# Patient Record
Sex: MALE | Race: ASIAN | NOT HISPANIC OR LATINO | Employment: OTHER | URBAN - METROPOLITAN AREA
[De-identification: names, ages, dates, MRNs, and addresses within clinical notes are randomized per-mention and may not be internally consistent; named-entity substitution may affect disease eponyms.]

---

## 2017-05-10 ENCOUNTER — OFFICE VISIT (OUTPATIENT)
Dept: MEDICAL GROUP | Facility: PHYSICIAN GROUP | Age: 57
End: 2017-05-10
Payer: COMMERCIAL

## 2017-05-10 VITALS
RESPIRATION RATE: 16 BRPM | HEART RATE: 70 BPM | SYSTOLIC BLOOD PRESSURE: 132 MMHG | DIASTOLIC BLOOD PRESSURE: 80 MMHG | TEMPERATURE: 98.6 F | BODY MASS INDEX: 24.34 KG/M2 | OXYGEN SATURATION: 96 % | WEIGHT: 170 LBS | HEIGHT: 70 IN

## 2017-05-10 DIAGNOSIS — E78.5 DYSLIPIDEMIA: ICD-10-CM

## 2017-05-10 DIAGNOSIS — R22.41 MASS OF LOWER EXTREMITY, RIGHT: ICD-10-CM

## 2017-05-10 DIAGNOSIS — R73.01 IMPAIRED FASTING BLOOD SUGAR: ICD-10-CM

## 2017-05-10 DIAGNOSIS — Z12.5 SCREENING FOR PROSTATE CANCER: ICD-10-CM

## 2017-05-10 PROCEDURE — 99213 OFFICE O/P EST LOW 20 MIN: CPT | Performed by: FAMILY MEDICINE

## 2017-05-10 NOTE — PROGRESS NOTES
"Subjective:      Leander Saxena is a 57 y.o. male who presents with Other            Other      Patient is here accompanied by his wife because of a lump back of the right knee that he noticed about 2 weeks ago. He has pain and discomfort when he kneels but otherwise there is no pain when he is walking or doing other activities. He denies any trauma.    Past medical history, past surgical history, family history reviewed-no changes    Social history reviewed-no changes    Allergies reviewed-no changes    Medications reviewed-no changes    ROS     As per history of present illness, the rest are negative.       Objective:     /80 mmHg  Pulse 70  Temp(Src) 37 °C (98.6 °F)  Resp 16  Ht 1.778 m (5' 10\")  Wt 77.111 kg (170 lb)  BMI 24.39 kg/m2  SpO2 96%     Physical Exam     Examined alert, awake, oriented, not in distress    Neck-supple, no lymphadenopathy, no thyromegaly  Lungs-clear to auscultation, no rales, no wheezes  Heart-regular rate and rhythm, no murmur  Extremities-no edema, clubbing, cyanosis, positive soft subcutaneous mass right popliteal fossa which is nontender, probably Baker's cyst            Assessment/Plan:     1. Mass of lower extremity, right  We'll get ultrasound to further evaluate this. Discussed natural course of the problem if this is Baker's cyst. We don't do any intervention unless this this increases in size and causes more discomfort and pain.  - US -EXTREMITY NON VASCULAR UNILATERAL RIGHT; Future  - LIPID PROFILE; Future  - COMP METABOLIC PANEL; Future  - HEMOGLOBIN A1C; Future  - CBC WITH DIFFERENTIAL; Future  - PROSTATE SPECIFIC AG SCREENING; Future    2. Impaired fasting blood sugar  He will do blood work to follow-up on his blood sugar, hemoglobin A1c to be done before his next appointment in September  - LIPID PROFILE; Future  - COMP METABOLIC PANEL; Future  - HEMOGLOBIN A1C; Future  - CBC WITH DIFFERENTIAL; Future  - PROSTATE SPECIFIC AG SCREENING; Future    3. " Dyslipidemia  We will do follow-up lipid panel to be done prior to his next appointment.  - LIPID PROFILE; Future  - COMP METABOLIC PANEL; Future  - HEMOGLOBIN A1C; Future  - CBC WITH DIFFERENTIAL; Future  - PROSTATE SPECIFIC AG SCREENING; Future    4. Screening for prostate cancer  We will include screening PSA with the next blood work.  - LIPID PROFILE; Future  - COMP METABOLIC PANEL; Future  - HEMOGLOBIN A1C; Future  - CBC WITH DIFFERENTIAL; Future  - PROSTATE SPECIFIC AG SCREENING; Future      Please note that this dictation was created using voice recognition software. I have worked with consultants from the vendor as well as technical experts from [employer] to optimize the interface. I have made every reasonable attempt to correct obvious errors, but I expect that there are errors of grammar and possibly content I did not discover before finalizing the note.

## 2017-05-10 NOTE — MR AVS SNAPSHOT
"        Leander Gonsalezkelly   5/10/2017 10:40 AM   Office Visit   MRN: [id number]    Department:  [facility]   Dept Phone:  [phone]    Description:  Male : 1960   Provider:  Marcela Tomas M.D.           Reason for Visit     Other mass on the back of R knee       Allergies as of 5/10/2017     No Known Allergies      You were diagnosed with     Mass of lower extremity, right   [3449731]       Impaired fasting blood sugar   [955688]       Dyslipidemia   [440946]       Screening for prostate cancer   [[id number]]         Vital Signs     Blood Pressure Pulse Temperature Respirations Height Weight    132/80 mmHg 70 37 °C (98.6 °F) 16 1.778 m (5' 10\") 77.111 kg (170 lb)    Body Mass Index Oxygen Saturation Smoking Status             24.39 kg/m2 96% Current Every Day Smoker         Basic Information     Date Of Birth Sex Race Ethnicity Preferred Language    1960 Male  Unknown English      Your appointments     Sep 14, 2017 10:00 AM   Established Patient with Marcela Tomas M.D.   [facility] (--)    [address]  [address]  [facility] [id number] [phone]           You will be receiving a confirmation call a few days before your appointment from our automated call confirmation system.              Problem List              ICD-10-CM Priority Class Noted - Resolved    Dyslipidemia E78.5   2014 - Present    Impaired fasting blood sugar R73.01   2016 - Present      Health Maintenance        Date Due Completion Dates    IMM DTaP/Tdap/Td Vaccine (2 - Td) 2023    COLONOSCOPY 2023            Current Immunizations     Influenza TIV (IM) 11/3/2011    Influenza Vaccine Quad Inj (Preserved) 2016, 2014    Tdap Vaccine 2013      Below and/or attached are the medications your provider expects you to take. Review all of your home medications and newly ordered medications with your provider and/or pharmacist. Follow medication instructions as " directed by your provider and/or pharmacist. Please keep your medication list with you and share with your provider. Update the information when medications are discontinued, doses are changed, or new medications (including over-the-counter products) are added; and carry medication information at all times in the event of emergency situations     Allergies:  No Known Allergies          Medications  Valid as of: May 10, 2017 - 10:54 AM    Generic Name Brand Name Tablet Size Instructions for use    .                 Medicines prescribed today were sent to:     None      Medication refill instructions:       If your prescription bottle indicates you have medication refills left, it is not necessary to call your provider’s office. Please contact your pharmacy and they will refill your medication.    If your prescription bottle indicates you do not have any refills left, you may request refills at any time through one of the following ways: The online [employer] system (except Urgent Care), by calling your provider’s office, or by asking your pharmacy to contact your provider’s office with a refill request. Medication refills are processed only during regular business hours and may not be available until the next business day. Your provider may request additional information or to have a follow-up visit with you prior to refilling your medication.   *Please Note: Medication refills are assigned a new Rx number when refilled electronically. Your pharmacy may indicate that no refills were authorized even though a new prescription for the same medication is available at the pharmacy. Please request the medicine by name with the pharmacy before contacting your provider for a refill.        Your To Do List     Future Labs/Procedures Complete By Expires    CBC WITH DIFFERENTIAL  As directed 5/11/2018    COMP METABOLIC PANEL  As directed 5/11/2018    HEMOGLOBIN A1C  As directed 5/11/2018    LIPID PROFILE  As directed 5/11/2018    PROSTATE SPECIFIC AG SCREENING  As directed 5/11/2018    US-EXTREMITY NON VASCULAR UNILATERAL RIGHT  As directed 5/10/2018         S*[employer] Access Code: Activation code not generated  Current S*[employer] Status: Active          Quit Tobacco Information     Do you want to quit using tobacco?    Quitting tobacco decreases risks of cancer, heart and lung disease, increases life expectancy, improves sense of taste and smell, and increases spending money, among other benefits.    If you are thinking about quitting, we can help.  • [facility] Quit Tobacco Program: 817.120.6211  o Program occurs weekly for four weeks and includes pharmacist consultation on products to support quitting smoking or chewing tobacco. A provider referral is needed for pharmacist consultation.  • Tobacco Users Help Hotline: 0-079-QUITNOW (880-9983) or https://[place].quitlogix.org/  o Free, confidential telephone and online coaching for [place] residents. Sessions are designed on a schedule that is convenient for you. Eligible clients receive free nicotine replacement therapy.  • Nationally: www.smokefree.gov  o Information and professional assistance to support both immediate and long-term needs as you become, and remain, a non-smoker. Smokefree.gov allows you to choose the help that best fits your needs.

## 2017-05-17 ENCOUNTER — HOSPITAL ENCOUNTER (OUTPATIENT)
Dept: RADIOLOGY | Facility: MEDICAL CENTER | Age: 57
End: 2017-05-17
Attending: FAMILY MEDICINE
Payer: COMMERCIAL

## 2017-05-17 DIAGNOSIS — R22.41 MASS OF LOWER EXTREMITY, RIGHT: ICD-10-CM

## 2017-05-17 PROCEDURE — 76882 US LMTD JT/FCL EVL NVASC XTR: CPT | Mod: RT

## 2017-09-11 ENCOUNTER — HOSPITAL ENCOUNTER (OUTPATIENT)
Dept: LAB | Facility: MEDICAL CENTER | Age: 57
End: 2017-09-11
Attending: FAMILY MEDICINE
Payer: COMMERCIAL

## 2017-09-11 DIAGNOSIS — Z12.5 SCREENING FOR PROSTATE CANCER: ICD-10-CM

## 2017-09-11 DIAGNOSIS — E78.5 DYSLIPIDEMIA: ICD-10-CM

## 2017-09-11 DIAGNOSIS — R22.41 MASS OF LOWER EXTREMITY, RIGHT: ICD-10-CM

## 2017-09-11 DIAGNOSIS — R73.01 IMPAIRED FASTING BLOOD SUGAR: ICD-10-CM

## 2017-09-11 LAB
ALBUMIN SERPL BCP-MCNC: 4.1 G/DL (ref 3.2–4.9)
ALBUMIN/GLOB SERPL: 1.5 G/DL
ALP SERPL-CCNC: 48 U/L (ref 30–99)
ALT SERPL-CCNC: 16 U/L (ref 2–50)
ANION GAP SERPL CALC-SCNC: 6 MMOL/L (ref 0–11.9)
AST SERPL-CCNC: 17 U/L (ref 12–45)
BASOPHILS # BLD AUTO: 1.4 % (ref 0–1.8)
BASOPHILS # BLD: 0.09 K/UL (ref 0–0.12)
BILIRUB SERPL-MCNC: 0.7 MG/DL (ref 0.1–1.5)
BUN SERPL-MCNC: 14 MG/DL (ref 8–22)
CALCIUM SERPL-MCNC: 9.3 MG/DL (ref 8.5–10.5)
CHLORIDE SERPL-SCNC: 105 MMOL/L (ref 96–112)
CHOLEST SERPL-MCNC: 161 MG/DL (ref 100–199)
CO2 SERPL-SCNC: 27 MMOL/L (ref 20–33)
CREAT SERPL-MCNC: 0.73 MG/DL (ref 0.5–1.4)
EOSINOPHIL # BLD AUTO: 0.33 K/UL (ref 0–0.51)
EOSINOPHIL NFR BLD: 5.2 % (ref 0–6.9)
ERYTHROCYTE [DISTWIDTH] IN BLOOD BY AUTOMATED COUNT: 40.1 FL (ref 35.9–50)
EST. AVERAGE GLUCOSE BLD GHB EST-MCNC: 131 MG/DL
GFR SERPL CREATININE-BSD FRML MDRD: >60 ML/MIN/1.73 M 2
GLOBULIN SER CALC-MCNC: 2.8 G/DL (ref 1.9–3.5)
GLUCOSE SERPL-MCNC: 99 MG/DL (ref 65–99)
HBA1C MFR BLD: 6.2 % (ref 0–5.6)
HCT VFR BLD AUTO: 46.3 % (ref 42–52)
HDLC SERPL-MCNC: 45 MG/DL
HGB BLD-MCNC: 15.2 G/DL (ref 14–18)
IMM GRANULOCYTES # BLD AUTO: 0.01 K/UL (ref 0–0.11)
IMM GRANULOCYTES NFR BLD AUTO: 0.2 % (ref 0–0.9)
LDLC SERPL CALC-MCNC: 95 MG/DL
LYMPHOCYTES # BLD AUTO: 2.12 K/UL (ref 1–4.8)
LYMPHOCYTES NFR BLD: 33.4 % (ref 22–41)
MCH RBC QN AUTO: 27.3 PG (ref 27–33)
MCHC RBC AUTO-ENTMCNC: 32.8 G/DL (ref 33.7–35.3)
MCV RBC AUTO: 83.1 FL (ref 81.4–97.8)
MONOCYTES # BLD AUTO: 0.6 K/UL (ref 0–0.85)
MONOCYTES NFR BLD AUTO: 9.5 % (ref 0–13.4)
NEUTROPHILS # BLD AUTO: 3.19 K/UL (ref 1.82–7.42)
NEUTROPHILS NFR BLD: 50.3 % (ref 44–72)
NRBC # BLD AUTO: 0 K/UL
NRBC BLD AUTO-RTO: 0 /100 WBC
PLATELET # BLD AUTO: 241 K/UL (ref 164–446)
PMV BLD AUTO: 9 FL (ref 9–12.9)
POTASSIUM SERPL-SCNC: 4 MMOL/L (ref 3.6–5.5)
PROT SERPL-MCNC: 6.9 G/DL (ref 6–8.2)
PSA SERPL-MCNC: 1.18 NG/ML (ref 0–4)
RBC # BLD AUTO: 5.57 M/UL (ref 4.7–6.1)
SODIUM SERPL-SCNC: 138 MMOL/L (ref 135–145)
TRIGL SERPL-MCNC: 104 MG/DL (ref 0–149)
WBC # BLD AUTO: 6.3 K/UL (ref 4.8–10.8)

## 2017-09-11 PROCEDURE — 36415 COLL VENOUS BLD VENIPUNCTURE: CPT

## 2017-09-11 PROCEDURE — 85025 COMPLETE CBC W/AUTO DIFF WBC: CPT

## 2017-09-11 PROCEDURE — 80053 COMPREHEN METABOLIC PANEL: CPT

## 2017-09-11 PROCEDURE — 84153 ASSAY OF PSA TOTAL: CPT

## 2017-09-11 PROCEDURE — 83036 HEMOGLOBIN GLYCOSYLATED A1C: CPT

## 2017-09-11 PROCEDURE — 80061 LIPID PANEL: CPT

## 2017-09-13 ENCOUNTER — TELEPHONE (OUTPATIENT)
Dept: MEDICAL GROUP | Facility: PHYSICIAN GROUP | Age: 57
End: 2017-09-13

## 2017-09-13 NOTE — TELEPHONE ENCOUNTER
ESTABLISHED PATIENT PRE-VISIT PLANNING     Note: Patient will not be contacted if there is no indication to call.     1.  Reviewed notes from the last few office visits within the medical group: Yes    2.  If any orders were placed at last visit or intended to be done for this visit (i.e. 6 mos follow-up), do we have Results/Consult Notes?        •  Labs - Labs ordered, completed and results are in chart.       •  Imaging - Imaging ordered, completed and results are in chart.       •  Referrals - No referrals were ordered at last office visit.    3. Is this appointment scheduled as a Hospital Follow-Up? No    4.  Immunizations were updated in Epic using WebIZ?: Epic matches WebIZ       •  Web Iz Recommendations: FLU, MMR , TD and VARICELLA (Chicken Pox)     5.  Patient is due for the following Health Maintenance Topics:   Health Maintenance Due   Topic Date Due   • IMM PNEUMOCOCCAL 19-64 (ADULT) MEDIUM RISK SERIES (1 of 1 - PPSV23) 02/22/1978   • IMM INFLUENZA (1) 09/01/2017       - Patient has completed FLU and TDAP Immunization(s) per WebIZ. Chart has been updated.      6.  Patient was NOT informed to arrive 15 min prior to their scheduled appointment and bring in their medication bottles.

## 2017-09-14 ENCOUNTER — OFFICE VISIT (OUTPATIENT)
Dept: MEDICAL GROUP | Facility: PHYSICIAN GROUP | Age: 57
End: 2017-09-14
Payer: COMMERCIAL

## 2017-09-14 VITALS
DIASTOLIC BLOOD PRESSURE: 60 MMHG | HEIGHT: 70 IN | TEMPERATURE: 97.9 F | BODY MASS INDEX: 22.91 KG/M2 | SYSTOLIC BLOOD PRESSURE: 116 MMHG | OXYGEN SATURATION: 95 % | WEIGHT: 160.05 LBS | HEART RATE: 62 BPM

## 2017-09-14 DIAGNOSIS — R73.01 IMPAIRED FASTING BLOOD SUGAR: ICD-10-CM

## 2017-09-14 DIAGNOSIS — F17.200 TOBACCO DEPENDENCE: ICD-10-CM

## 2017-09-14 DIAGNOSIS — Z00.00 ROUTINE PHYSICAL EXAMINATION: ICD-10-CM

## 2017-09-14 DIAGNOSIS — E78.5 DYSLIPIDEMIA: ICD-10-CM

## 2017-09-14 DIAGNOSIS — Z23 NEED FOR IMMUNIZATION AGAINST INFLUENZA: ICD-10-CM

## 2017-09-14 PROCEDURE — 99396 PREV VISIT EST AGE 40-64: CPT | Mod: 25 | Performed by: FAMILY MEDICINE

## 2017-09-14 PROCEDURE — 90471 IMMUNIZATION ADMIN: CPT | Performed by: FAMILY MEDICINE

## 2017-09-14 PROCEDURE — 99406 BEHAV CHNG SMOKING 3-10 MIN: CPT | Performed by: FAMILY MEDICINE

## 2017-09-14 PROCEDURE — 99214 OFFICE O/P EST MOD 30 MIN: CPT | Mod: 25 | Performed by: FAMILY MEDICINE

## 2017-09-14 PROCEDURE — 90686 IIV4 VACC NO PRSV 0.5 ML IM: CPT | Performed by: FAMILY MEDICINE

## 2017-09-14 ASSESSMENT — PATIENT HEALTH QUESTIONNAIRE - PHQ9: CLINICAL INTERPRETATION OF PHQ2 SCORE: 0

## 2017-09-14 NOTE — PROGRESS NOTES
Subjective:      Leander Saxena is a 57 y.o. male who presents with Follow-Up (labs) and Immunizations (flu)            HPI     Patient returns routine physical exam as well as for follow-up of his medical problems.    His last colonoscopy was in 6/13 that showed adenomatous polyp therefore he needs repeat colonoscopy in 2018. His last tdap was in 5/13.    We follow him for impaired fasting blood sugar. He manages this by watching his diet.    He has dyslipidemia which he is managing with his own efforts. He said he has been more watchful with his diet and he has been keeping himself active. He did a follow-up blood work before this visit.    He continues to smoke 3 cigarettes a day for the last 20 years.    I reviewed the following    Past Medical History:   Diagnosis Date   • Dyslipidemia    • Impaired fasting blood sugar         Past Surgical History:   Procedure Laterality Date   • COLONOSCOPY WITH POLYP  6/19/13    asc colon polyp-adenomatous, mild diverticulosis asc colon       No Known Allergies    No current outpatient prescriptions on file.     No current facility-administered medications for this visit.         Family History   Problem Relation Age of Onset   • Diabetes Mother    • Hypertension Mother    • Hyperlipidemia Mother    • Heart Attack Father    • Hypertension Father    • Hyperlipidemia Father    • Diabetes Sister    • Diabetes Brother    • Heart Attack Brother      age 60   • Diabetes Brother    • No Known Problems Sister        Social History     Social History   • Marital status:      Spouse name: N/A   • Number of children: 2   • Years of education: N/A     Occupational History   • retired Other     Social History Main Topics   • Smoking status: Current Every Day Smoker     Years: 20.00     Types: Cigarettes   • Smokeless tobacco: Never Used      Comment: 3 cig/day   • Alcohol use 0.0 oz/week      Comment: occasional   • Drug use:      Types: Marijuana      Comment: used cocaine in  "the past   • Sexual activity: Yes     Partners: Female     Other Topics Concern   • Not on file     Social History Narrative   • No narrative on file        ROS     Review of Systems  Constitutional: Negative for fever, chills, weight loss and malaise/fatigue.   HEENT: Negative for ear pain, nosebleeds, congestion, sore throat and neck pain.    Eyes: Negative for blurred vision.   Respiratory: Negative for cough, sputum production, shortness of breath and wheezing.    Cardiovascular: Negative for chest pain, palpitations, orthopnea and leg swelling.   Gastrointestinal: Negative for heartburn, nausea, vomiting and abdominal pain.   Genitourinary: Negative for dysuria, urgency and frequency.   Musculoskeletal: Negative for myalgias, back pain and joint pain.   Skin: Negative for rash and itching.   Neurological: Negative for dizziness, tingling, tremors, sensory change, focal weakness and headaches.   Endo/Heme/Allergies: Does not bruise/bleed easily.   Psychiatric/Behavioral: Negative for depression, anxiety, or memory loss.     All other systems reviewed and are negative except as in HPI.         Objective:     /60   Pulse 62   Temp 36.6 °C (97.9 °F)   Ht 1.778 m (5' 10\")   Wt 72.6 kg (160 lb 0.9 oz)   SpO2 95%   BMI 22.97 kg/m²      Physical Exam   Constitutional: He is oriented to person, place, and time. He appears well-developed and well-nourished. No distress.   HENT:   Head: Normocephalic and atraumatic.   Right Ear: External ear normal.   Left Ear: External ear normal.   Nose: Nose normal.   Mouth/Throat: Oropharynx is clear and moist. No oropharyngeal exudate.   Eyes: Conjunctivae and EOM are normal. Pupils are equal, round, and reactive to light. Right eye exhibits no discharge. Left eye exhibits no discharge. No scleral icterus.   Neck: Normal range of motion. Neck supple. No JVD present. No tracheal deviation present. No thyromegaly present.   Cardiovascular: Normal rate, regular rhythm, " normal heart sounds and intact distal pulses.  Exam reveals no gallop and no friction rub.    No murmur heard.  Pulmonary/Chest: Effort normal and breath sounds normal. No stridor. No respiratory distress. He has no wheezes. He has no rales. He exhibits no tenderness.   Abdominal: Soft. Bowel sounds are normal. He exhibits no distension and no mass. There is no tenderness. There is no rebound and no guarding. Hernia confirmed negative in the right inguinal area and confirmed negative in the left inguinal area.   Genitourinary: Rectum normal, prostate normal, testes normal and penis normal. Rectal exam shows no external hemorrhoid, no internal hemorrhoid, no fissure, no mass, no tenderness, anal tone normal and guaiac negative stool. Prostate is not enlarged and not tender. Right testis shows no mass, no swelling and no tenderness. Right testis is descended. Left testis shows no mass, no swelling and no tenderness. Left testis is descended. Circumcised. No phimosis, penile erythema or penile tenderness. No discharge found.   Musculoskeletal: Normal range of motion. He exhibits no edema or tenderness.   Lymphadenopathy:     He has no cervical adenopathy.        Right: No inguinal adenopathy present.        Left: No inguinal adenopathy present.   Neurological: He is alert and oriented to person, place, and time. He displays normal reflexes. No cranial nerve deficit. He exhibits normal muscle tone. Coordination normal.   Skin: Skin is warm and dry. No rash noted. He is not diaphoretic. No erythema. No pallor.   Psychiatric: He has a normal mood and affect. His behavior is normal. Judgment and thought content normal.   Nursing note and vitals reviewed.     Hospital Outpatient Visit on 09/11/2017   Component Date Value   • Cholesterol,Tot 09/11/2017 161 Previously 197    • Triglycerides 09/11/2017 104 Previously 76    • HDL 09/11/2017 45 Previously 53    • LDL 09/11/2017 95 Previously 129    • Sodium 09/11/2017 138    •  Potassium 09/11/2017 4.0    • Chloride 09/11/2017 105    • Co2 09/11/2017 27    • Anion Gap 09/11/2017 6.0    • Glucose 09/11/2017 99    • Bun 09/11/2017 14    • Creatinine 09/11/2017 0.73    • Calcium 09/11/2017 9.3    • AST(SGOT) 09/11/2017 17    • ALT(SGPT) 09/11/2017 16    • Alkaline Phosphatase 09/11/2017 48    • Total Bilirubin 09/11/2017 0.7    • Albumin 09/11/2017 4.1    • Total Protein 09/11/2017 6.9    • Globulin 09/11/2017 2.8    • A-G Ratio 09/11/2017 1.5    • Glycohemoglobin 09/11/2017 6.2*   • Est Avg Glucose 09/11/2017 131    • WBC 09/11/2017 6.3    • RBC 09/11/2017 5.57    • Hemoglobin 09/11/2017 15.2    • Hematocrit 09/11/2017 46.3    • MCV 09/11/2017 83.1    • MCH 09/11/2017 27.3    • MCHC 09/11/2017 32.8*   • RDW 09/11/2017 40.1    • Platelet Count 09/11/2017 241    • MPV 09/11/2017 9.0    • Neutrophils-Polys 09/11/2017 50.30    • Lymphocytes 09/11/2017 33.40    • Monocytes 09/11/2017 9.50    • Eosinophils 09/11/2017 5.20    • Basophils 09/11/2017 1.40    • Immature Granulocytes 09/11/2017 0.20    • Nucleated RBC 09/11/2017 0.00    • Neutrophils (Absolute) 09/11/2017 3.19    • Lymphs (Absolute) 09/11/2017 2.12    • Monos (Absolute) 09/11/2017 0.60    • Eos (Absolute) 09/11/2017 0.33    • Baso (Absolute) 09/11/2017 0.09    • Immature Granulocytes (a* 09/11/2017 0.01    • NRBC (Absolute) 09/11/2017 0.00    • Prostatic Specific Antig* 09/11/2017 1.18    • GFR If  09/11/2017 >60    • GFR If Non  Ameri* 09/11/2017 >60                  Assessment/Plan:     1. Routine physical examination  Complete exam was done did up-to-date with tdap. He was given flu shot today. He will need a colonoscopy next year.    2. Need for immunization against influenza  Flu shot was given.  - Flu Quad Inj >3 Year Pre-Filled PF    3. Impaired fasting blood sugar  Blood sugar is normal but hemoglobin A1c is slightly elevated putting him at risk for diabetes. Advised to watch his diet in terms of carbs  and sweets, to do regular exercises and keep a healthy weight.    4. Dyslipidemia  LDL cholesterol improved significantly and this is now below 100. His 10 year cardiovascular disease risk is 9.1%. If he quit cigarette use he will drop to 5.4%. Discussed the need to quit cigarette use to decrease his risk of cardiovascular disease. He will continue to work on his diet, exercise and weight loss. Discussed at length the diet he needs to follow keep the cholesterol under control.    5. Tobacco dependence  Counseling done with regards to smoking cessation. Was made aware of risk of ongoing cigarette use including stroke and heart attack.    Follow-up yearly or sooner if needed.    Please note that this dictation was created using voice recognition software. I have worked with consultants from the vendor as well as technical experts from [employer] to optimize the interface. I have made every reasonable attempt to correct obvious errors, but I expect that there are errors of grammar and possibly content I did not discover before finalizing the note.

## 2018-09-17 DIAGNOSIS — R73.01 IMPAIRED FASTING BLOOD SUGAR: ICD-10-CM

## 2018-09-17 DIAGNOSIS — E78.5 DYSLIPIDEMIA: ICD-10-CM

## 2018-09-17 DIAGNOSIS — Z12.5 SCREENING FOR PROSTATE CANCER: ICD-10-CM

## 2018-09-25 ENCOUNTER — HOSPITAL ENCOUNTER (OUTPATIENT)
Dept: LAB | Facility: MEDICAL CENTER | Age: 58
End: 2018-09-25
Attending: FAMILY MEDICINE
Payer: COMMERCIAL

## 2018-09-25 DIAGNOSIS — Z12.5 SCREENING FOR PROSTATE CANCER: ICD-10-CM

## 2018-09-25 DIAGNOSIS — E78.5 DYSLIPIDEMIA: ICD-10-CM

## 2018-09-25 DIAGNOSIS — R73.01 IMPAIRED FASTING BLOOD SUGAR: ICD-10-CM

## 2018-09-25 LAB
ALBUMIN SERPL BCP-MCNC: 4.6 G/DL (ref 3.2–4.9)
ALBUMIN/GLOB SERPL: 1.8 G/DL
ALP SERPL-CCNC: 51 U/L (ref 30–99)
ALT SERPL-CCNC: 21 U/L (ref 2–50)
ANION GAP SERPL CALC-SCNC: 5 MMOL/L (ref 0–11.9)
AST SERPL-CCNC: 23 U/L (ref 12–45)
BASOPHILS # BLD AUTO: 1.3 % (ref 0–1.8)
BASOPHILS # BLD: 0.07 K/UL (ref 0–0.12)
BILIRUB SERPL-MCNC: 1.2 MG/DL (ref 0.1–1.5)
BUN SERPL-MCNC: 18 MG/DL (ref 8–22)
CALCIUM SERPL-MCNC: 9.6 MG/DL (ref 8.5–10.5)
CHLORIDE SERPL-SCNC: 105 MMOL/L (ref 96–112)
CHOLEST SERPL-MCNC: 252 MG/DL (ref 100–199)
CO2 SERPL-SCNC: 27 MMOL/L (ref 20–33)
CREAT SERPL-MCNC: 0.9 MG/DL (ref 0.5–1.4)
EOSINOPHIL # BLD AUTO: 0.31 K/UL (ref 0–0.51)
EOSINOPHIL NFR BLD: 5.8 % (ref 0–6.9)
ERYTHROCYTE [DISTWIDTH] IN BLOOD BY AUTOMATED COUNT: 41.2 FL (ref 35.9–50)
EST. AVERAGE GLUCOSE BLD GHB EST-MCNC: 134 MG/DL
FASTING STATUS PATIENT QL REPORTED: NORMAL
GLOBULIN SER CALC-MCNC: 2.5 G/DL (ref 1.9–3.5)
GLUCOSE SERPL-MCNC: 95 MG/DL (ref 65–99)
HBA1C MFR BLD: 6.3 % (ref 0–5.6)
HCT VFR BLD AUTO: 48.3 % (ref 42–52)
HDLC SERPL-MCNC: 55 MG/DL
HGB BLD-MCNC: 15.9 G/DL (ref 14–18)
IMM GRANULOCYTES # BLD AUTO: 0.01 K/UL (ref 0–0.11)
IMM GRANULOCYTES NFR BLD AUTO: 0.2 % (ref 0–0.9)
LDLC SERPL CALC-MCNC: 179 MG/DL
LYMPHOCYTES # BLD AUTO: 2.01 K/UL (ref 1–4.8)
LYMPHOCYTES NFR BLD: 37.7 % (ref 22–41)
MCH RBC QN AUTO: 28 PG (ref 27–33)
MCHC RBC AUTO-ENTMCNC: 32.9 G/DL (ref 33.7–35.3)
MCV RBC AUTO: 85 FL (ref 81.4–97.8)
MONOCYTES # BLD AUTO: 0.57 K/UL (ref 0–0.85)
MONOCYTES NFR BLD AUTO: 10.7 % (ref 0–13.4)
NEUTROPHILS # BLD AUTO: 2.36 K/UL (ref 1.82–7.42)
NEUTROPHILS NFR BLD: 44.3 % (ref 44–72)
NRBC # BLD AUTO: 0 K/UL
NRBC BLD-RTO: 0 /100 WBC
PLATELET # BLD AUTO: 232 K/UL (ref 164–446)
PMV BLD AUTO: 9.2 FL (ref 9–12.9)
POTASSIUM SERPL-SCNC: 3.9 MMOL/L (ref 3.6–5.5)
PROT SERPL-MCNC: 7.1 G/DL (ref 6–8.2)
PSA SERPL-MCNC: 1.41 NG/ML (ref 0–4)
RBC # BLD AUTO: 5.68 M/UL (ref 4.7–6.1)
SODIUM SERPL-SCNC: 137 MMOL/L (ref 135–145)
TRIGL SERPL-MCNC: 88 MG/DL (ref 0–149)
WBC # BLD AUTO: 5.3 K/UL (ref 4.8–10.8)

## 2018-09-25 PROCEDURE — 80061 LIPID PANEL: CPT

## 2018-09-25 PROCEDURE — 80053 COMPREHEN METABOLIC PANEL: CPT

## 2018-09-25 PROCEDURE — 84153 ASSAY OF PSA TOTAL: CPT

## 2018-09-25 PROCEDURE — 85025 COMPLETE CBC W/AUTO DIFF WBC: CPT

## 2018-09-25 PROCEDURE — 83036 HEMOGLOBIN GLYCOSYLATED A1C: CPT

## 2018-09-25 PROCEDURE — 36415 COLL VENOUS BLD VENIPUNCTURE: CPT

## 2018-11-05 ENCOUNTER — TELEPHONE (OUTPATIENT)
Dept: MEDICAL GROUP | Facility: PHYSICIAN GROUP | Age: 58
End: 2018-11-05

## 2018-11-05 NOTE — TELEPHONE ENCOUNTER
ESTABLISHED PATIENT PRE-VISIT PLANNING     Note: Patient will not be contacted if there is no indication to call.     1.  Reviewed notes from the last few office visits within the medical group: Yes    2.  If any orders were placed at last visit or intended to be done for this visit (i.e. 6 mos follow-up), do we have Results/Consult Notes?        •  Labs - Labs ordered, completed on 09/25/18 and results are in chart.   Note: If patient appointment is for lab review and patient did not complete labs, check with provider if OK to reschedule patient until labs completed.       •  Imaging - Imaging was not ordered at last office visit.       •  Referrals - No referrals were ordered at last office visit.    3. Is this appointment scheduled as a Hospital Follow-Up? No    4.  Immunizations were updated in Epic using WebIZ?: Epic matches WebIZ       •  Web Iz Recommendations: FLU, MMR , TD and ZOSTAVAX (Shingles)    5.  Patient is due for the following Health Maintenance Topics:   Health Maintenance Due   Topic Date Due   • IMM PNEUMOCOCCAL 19-64 (ADULT) MEDIUM RISK SERIES (1 of 1 - PPSV23) 02/22/1978   • IMM ZOSTER VACCINES (1 of 2) 02/22/2009   • IMM INFLUENZA (1) 09/01/2018       - Patient has completed FLU and TDAP Immunization(s) per WebIZ. Chart has been updated.    6.  MDX printed for Provider? NO    7.  Patient was NOT informed to arrive 15 min prior to their scheduled appointment and bring in their medication bottles.

## 2018-11-06 ENCOUNTER — OFFICE VISIT (OUTPATIENT)
Dept: MEDICAL GROUP | Facility: PHYSICIAN GROUP | Age: 58
End: 2018-11-06
Payer: COMMERCIAL

## 2018-11-06 VITALS
HEART RATE: 64 BPM | BODY MASS INDEX: 23.77 KG/M2 | DIASTOLIC BLOOD PRESSURE: 90 MMHG | OXYGEN SATURATION: 96 % | WEIGHT: 160.5 LBS | SYSTOLIC BLOOD PRESSURE: 160 MMHG | HEIGHT: 69 IN | TEMPERATURE: 98.2 F

## 2018-11-06 DIAGNOSIS — R03.0 ELEVATED BLOOD PRESSURE READING: ICD-10-CM

## 2018-11-06 DIAGNOSIS — E78.5 DYSLIPIDEMIA: ICD-10-CM

## 2018-11-06 DIAGNOSIS — K64.9 HEMORRHOIDS, UNSPECIFIED HEMORRHOID TYPE: ICD-10-CM

## 2018-11-06 DIAGNOSIS — R73.01 IMPAIRED FASTING BLOOD SUGAR: ICD-10-CM

## 2018-11-06 DIAGNOSIS — Z23 NEED FOR IMMUNIZATION AGAINST INFLUENZA: ICD-10-CM

## 2018-11-06 DIAGNOSIS — Z00.00 ROUTINE PHYSICAL EXAMINATION: ICD-10-CM

## 2018-11-06 DIAGNOSIS — K92.1 HEMATOCHEZIA: ICD-10-CM

## 2018-11-06 PROCEDURE — 90686 IIV4 VACC NO PRSV 0.5 ML IM: CPT | Performed by: FAMILY MEDICINE

## 2018-11-06 PROCEDURE — 90471 IMMUNIZATION ADMIN: CPT | Performed by: FAMILY MEDICINE

## 2018-11-06 PROCEDURE — 99396 PREV VISIT EST AGE 40-64: CPT | Mod: 25 | Performed by: FAMILY MEDICINE

## 2018-11-06 RX ORDER — HYDROCORTISONE ACETATE 25 MG/1
25 SUPPOSITORY RECTAL EVERY 12 HOURS
Qty: 14 SUPPOSITORY | Refills: 0 | Status: SHIPPED | OUTPATIENT
Start: 2018-11-06 | End: 2018-12-11

## 2018-11-06 RX ORDER — ATORVASTATIN CALCIUM 20 MG/1
20 TABLET, FILM COATED ORAL EVERY EVENING
Qty: 90 TAB | Refills: 1 | Status: SHIPPED | OUTPATIENT
Start: 2018-11-06 | End: 2019-03-12 | Stop reason: SDUPTHER

## 2018-11-06 ASSESSMENT — PATIENT HEALTH QUESTIONNAIRE - PHQ9: CLINICAL INTERPRETATION OF PHQ2 SCORE: 0

## 2018-11-06 NOTE — PROGRESS NOTES
Subjective:      Leander Saxena is a 58 y.o. male who presents with Annual Exam; Immunizations (flu); and Results (labs)            HPI     Patient is here for annual physical exam.  He also needs immunization as well as is here to go over lab results.  Is up-to-date with Tdap.    He had an adenomatous polyp when he had his colonoscopy in June 2013.  He did follow-up colonoscopy in June 2018 and came back normal.  He said in the last 3 weeks he has been seeing blood in his stool and when wiping himself after a bowel movement most of the time.  He said he has a bowel movement 1 time a day sometimes hard.  He has noticed more blood when the stool is hard.  He denies any rectal pain.    We also follow him for impaired fasting blood sugar and he tries to manage this by watching his diet.  He said he stays active by exercising every day with treadmill and lifting weights up to 100 pounds.    We also follow him for dyslipidemia and he has been managing this with his own efforts.    No prior history of hypertension but today's blood pressure is elevated.  He denies any headache, dizziness, lightheadedness, chest pain, palpitations, shortness of breath, leg edema.    I reviewed the following    Past Medical History:   Diagnosis Date   • Dyslipidemia    • Impaired fasting blood sugar         Past Surgical History:   Procedure Laterality Date   • COLONOSCOPY WITH POLYP  6/19/13    asc colon polyp-adenomatous, mild diverticulosis asc colon       No Known Allergies    Current Outpatient Prescriptions   Medication Sig Dispense Refill   • atorvastatin (LIPITOR) 20 MG Tab Take 1 Tab by mouth every evening. 90 Tab 1   • hydrocortisone (ANUSOL-HC) 25 MG Suppos Insert 1 Suppository in rectum every 12 hours. 14 Suppository 0     No current facility-administered medications for this visit.         Family History   Problem Relation Age of Onset   • Diabetes Mother    • Hypertension Mother    • Hyperlipidemia Mother    • Heart Attack  "Father         age 60s   • Hypertension Father    • Hyperlipidemia Father    • Diabetes Sister    • Diabetes Brother    • Heart Attack Brother         age 60   • Diabetes Brother    • No Known Problems Sister        Social History     Social History   • Marital status:      Spouse name: N/A   • Number of children: 2   • Years of education: N/A     Occupational History   • retired Other     Social History Main Topics   • Smoking status: Current Every Day Smoker     Years: 40.00     Types: Cigarettes   • Smokeless tobacco: Never Used      Comment: 3 cig/day   • Alcohol use 0.0 oz/week      Comment: occasional   • Drug use: Yes     Types: Marijuana, Inhaled      Comment: used cocaine in the past   • Sexual activity: Yes     Partners: Female     Other Topics Concern   • Not on file     Social History Narrative   • No narrative on file        ROS     Per HPI, the rest are negative.       Objective:     /90   Pulse 64   Temp 36.8 °C (98.2 °F) (Temporal)   Ht 1.753 m (5' 9\")   Wt 72.8 kg (160 lb 7.9 oz)   SpO2 96%   BMI 23.70 kg/m²      Physical Exam   Constitutional: He is oriented to person, place, and time. He appears well-developed and well-nourished. No distress.   HENT:   Head: Normocephalic and atraumatic.   Right Ear: External ear normal.   Left Ear: External ear normal.   Nose: Nose normal.   Mouth/Throat: Oropharynx is clear and moist. No oropharyngeal exudate.   Eyes: Pupils are equal, round, and reactive to light. Conjunctivae and EOM are normal. Right eye exhibits no discharge. Left eye exhibits no discharge. No scleral icterus.   Neck: Normal range of motion. Neck supple. No JVD present. No tracheal deviation present. No thyromegaly present.   Cardiovascular: Normal rate, regular rhythm, normal heart sounds and intact distal pulses.  Exam reveals no gallop and no friction rub.    No murmur heard.  Pulmonary/Chest: Effort normal and breath sounds normal. No stridor. No respiratory distress. " He has no wheezes. He has no rales. He exhibits no tenderness.   Abdominal: Soft. Bowel sounds are normal. He exhibits no distension and no mass. There is no tenderness. There is no rebound and no guarding. Hernia confirmed negative in the right inguinal area and confirmed negative in the left inguinal area.   Genitourinary: Prostate normal, testes normal and penis normal. Rectal exam shows internal hemorrhoid and guaiac positive stool. Rectal exam shows no external hemorrhoid, no fissure, no mass, no tenderness and anal tone normal. Prostate is not enlarged and not tender. Right testis shows no mass, no swelling and no tenderness. Right testis is descended. Left testis shows no mass, no swelling and no tenderness. Left testis is descended. Circumcised. No phimosis, penile erythema or penile tenderness. No discharge found.   Genitourinary Comments: Positive hemorrhoidal skin tags, no anal fissure, no active bleeding, tight sphincter tone  Anoscopy-positive small nonbleeding internal hemorrhoids   Musculoskeletal: Normal range of motion. He exhibits no edema or tenderness.   Lymphadenopathy:     He has no cervical adenopathy. No inguinal adenopathy noted on the right or left side.        Right: No inguinal adenopathy present.        Left: No inguinal adenopathy present.   Neurological: He is alert and oriented to person, place, and time. He displays normal reflexes. No cranial nerve deficit. He exhibits normal muscle tone. Coordination normal.   Skin: Skin is warm and dry. No rash noted. He is not diaphoretic. No erythema. No pallor.   Psychiatric: He has a normal mood and affect. His behavior is normal. Judgment and thought content normal.   Nursing note and vitals reviewed.                   No visits with results within 1 Month(s) from this visit.   Latest known visit with results is:   Hospital Outpatient Visit on 09/25/2018   Component Date Value   • Cholesterol,Tot 09/25/2018 252*   • Triglycerides 09/25/2018  88    • HDL 09/25/2018 55    • LDL 09/25/2018 179*   • Sodium 09/25/2018 137    • Potassium 09/25/2018 3.9    • Chloride 09/25/2018 105    • Co2 09/25/2018 27    • Anion Gap 09/25/2018 5.0    • Glucose 09/25/2018 95    • Bun 09/25/2018 18    • Creatinine 09/25/2018 0.90    • Calcium 09/25/2018 9.6    • AST(SGOT) 09/25/2018 23    • ALT(SGPT) 09/25/2018 21    • Alkaline Phosphatase 09/25/2018 51    • Total Bilirubin 09/25/2018 1.2    • Albumin 09/25/2018 4.6    • Total Protein 09/25/2018 7.1    • Globulin 09/25/2018 2.5    • A-G Ratio 09/25/2018 1.8    • Glycohemoglobin 09/25/2018 6.3*   • Est Avg Glucose 09/25/2018 134    • Prostatic Specific Antig* 09/25/2018 1.41    • WBC 09/25/2018 5.3    • RBC 09/25/2018 5.68    • Hemoglobin 09/25/2018 15.9    • Hematocrit 09/25/2018 48.3    • MCV 09/25/2018 85.0    • MCH 09/25/2018 28.0    • MCHC 09/25/2018 32.9*   • RDW 09/25/2018 41.2    • Platelet Count 09/25/2018 232    • MPV 09/25/2018 9.2    • Neutrophils-Polys 09/25/2018 44.30    • Lymphocytes 09/25/2018 37.70    • Monocytes 09/25/2018 10.70    • Eosinophils 09/25/2018 5.80    • Basophils 09/25/2018 1.30    • Immature Granulocytes 09/25/2018 0.20    • Nucleated RBC 09/25/2018 0.00    • Neutrophils (Absolute) 09/25/2018 2.36    • Lymphs (Absolute) 09/25/2018 2.01    • Monos (Absolute) 09/25/2018 0.57    • Eos (Absolute) 09/25/2018 0.31    • Baso (Absolute) 09/25/2018 0.07    • Immature Granulocytes (a* 09/25/2018 0.01    • NRBC (Absolute) 09/25/2018 0.00    • Fasting Status 09/25/2018 Fasting    • GFR If  09/25/2018 >60    • GFR If Non  Ameri* 09/25/2018 >60      Assessment/Plan:     1. Routine physical examination  Complete exam was done.  Up-to-date with Tdap.  Up-to-date with colonoscopy.  Flu shot was given today.    2. Need for immunization against influenza  Flu shot was given.  - Influenza Vaccine Quad Injection >3Y (PF)    3. Hematochezia  I did not see any active bleeding but he does have  small internal hemorrhoids.  We will try Anusol suppository 1 suppository every 12 hours for 7 days.  Advised increase fiber intake including fruits, vegetables and increase fluid intake.  Advised to get over-the-counter fiber supplement.  Reevaluate in a month.  If this does not clear we will consider referral back to GI specialist.  - hydrocortisone (ANUSOL-HC) 25 MG Suppos; Insert 1 Suppository in rectum every 12 hours.  Dispense: 14 Suppository; Refill: 0    4. Dyslipidemia  LDL cholesterol is now significantly elevated.  10-year ASCVD risk is high at 21.4%.  Patient continues to smoke about 3-4 cigarettes/day for the last 40 years.  Start atorvastatin 20 mg 1 tablet at night.  Discussed possible side effects.  Recheck lipid panel in 3 months.  Advised watching the diet with avoidance of fatty foods, eating more fruits and vegetables, fish, regular exercise and keeping a healthy weight.  - atorvastatin (LIPITOR) 20 MG Tab; Take 1 Tab by mouth every evening.  Dispense: 90 Tab; Refill: 1    5. Impaired fasting blood sugar  Fasting blood sugar normal but hemoglobin A1c borderline elevated.  Continue to avoid sweets and decrease the carbs.  Continue with regular exercises.    6. Elevated blood pressure reading  No prior history of hypertension.  He will monitor his blood pressure at home and keep a record and bring on his return.  Advised to watch the salt intake.  Consider starting the medication if blood pressure remains elevated.    Please note that this dictation was created using voice recognition software. I have worked with consultants from the vendor as well as technical experts from [employer] to optimize the interface. I have made every reasonable attempt to correct obvious errors, but I expect that there are errors of grammar and possibly content I did not discover before finalizing the note.

## 2018-12-11 ENCOUNTER — OFFICE VISIT (OUTPATIENT)
Dept: MEDICAL GROUP | Facility: PHYSICIAN GROUP | Age: 58
End: 2018-12-11
Payer: COMMERCIAL

## 2018-12-11 VITALS
WEIGHT: 160.72 LBS | HEIGHT: 69 IN | HEART RATE: 60 BPM | OXYGEN SATURATION: 96 % | BODY MASS INDEX: 23.8 KG/M2 | TEMPERATURE: 98 F | DIASTOLIC BLOOD PRESSURE: 70 MMHG | SYSTOLIC BLOOD PRESSURE: 120 MMHG

## 2018-12-11 DIAGNOSIS — R03.0 ELEVATED BLOOD PRESSURE READING: ICD-10-CM

## 2018-12-11 DIAGNOSIS — R73.01 IMPAIRED FASTING BLOOD SUGAR: ICD-10-CM

## 2018-12-11 DIAGNOSIS — E78.5 DYSLIPIDEMIA: ICD-10-CM

## 2018-12-11 PROCEDURE — 99214 OFFICE O/P EST MOD 30 MIN: CPT | Performed by: FAMILY MEDICINE

## 2018-12-11 NOTE — PROGRESS NOTES
"Subjective:      Leander Saxena is a 58 y.o. male who presents with Hypertension        HPI:  The patient presents today for a 1 month follow-up for high blood pressure during his last physical exam.  His blood pressure has returned to normal today without any medicinal intervention. He states he has been exercising much more frequently, adjusted his diet to contain fish 3 times a week, and less salty/fatty foods. This has also been in an attempt to adjust his glucose levels which were previously pre-diabetic.    A month ago we started him on atorvastatin because the LDL cholesterol increased from  and total cholesterol from 161-252 with 10-year ASCVD risk of 21.4%.  He is tolerating atorvastatin without any side effects.  As mentioned above he has modified his diet and he has been exercising.    Past medical history, past surgical history, family history reviewed-no changes    Social history reviewed-no changes    Allergies reviewed-no changes    Medications reviewed-no changes      ROS:  As per the HPI as shown above, the rest are negative.       Objective:     /70 (BP Location: Left arm, Patient Position: Sitting, BP Cuff Size: Adult)   Pulse 60   Temp 36.7 °C (98 °F) (Temporal)   Ht 1.753 m (5' 9\")   Wt 72.9 kg (160 lb 11.5 oz)   SpO2 96%   BMI 23.73 kg/m²     Physical Exam  Examined alert, awake, oriented, not in distress    Neck-supple, no lymphadenopathy, no thyromegaly  Lungs-clear to auscultation, no rales, no wheezes  Heart-regular rate and rhythm, no murmur  Extremities-no edema, clubbing, cyanosis       Assessment/Plan:   1. Elevated blood pressure reading  Blood pressure is now down to normal.  The patient was counseled regarding continuing to improve his diet. I advised that he stick to lean meats and fish, avoid frying food, and consume less fat, salt, and carbohydrates. We will follow up soon for lipid profile/glucose monitoring. I recommended that he maintain his current exercise " regimen and keep a healthy weight and to continue to improve his overall health.    2. Dyslipidemia  He is tolerating the atorvastatin without side effects.  We will do follow-up lipid panel in 3 months to see if there is improvement of his LDL cholesterol.  - Lipid Profile; Future  - COMP METABOLIC PANEL; Future  - HEMOGLOBIN A1C; Future    3. Impaired fasting blood sugar  He will continue to manage this with his own efforts with avoidance of sweets, decreasing the carbs, regular exercises and keeping a healthy weight.  Recheck lab work in 3 months.  - Lipid Profile; Future  - COMP METABOLIC PANEL; Future  - HEMOGLOBIN A1C; Future        Darnell SMITH (Scribe), am scribing for, and in the presence of, Marcela Tomas MD     Electronically signed by: Darnell Szymanski (Scribe), 12/11/2018    Marcela SMITH MD personally performed the services described in this documentation, as scribed by Darnell Szymanski in my presence, and it is both accurate and complete.

## 2019-03-08 ENCOUNTER — HOSPITAL ENCOUNTER (OUTPATIENT)
Dept: LAB | Facility: MEDICAL CENTER | Age: 59
End: 2019-03-08
Attending: FAMILY MEDICINE
Payer: COMMERCIAL

## 2019-03-08 DIAGNOSIS — E78.5 DYSLIPIDEMIA: ICD-10-CM

## 2019-03-08 DIAGNOSIS — R73.01 IMPAIRED FASTING BLOOD SUGAR: ICD-10-CM

## 2019-03-08 LAB
ALBUMIN SERPL BCP-MCNC: 4.6 G/DL (ref 3.2–4.9)
ALBUMIN/GLOB SERPL: 1.8 G/DL
ALP SERPL-CCNC: 52 U/L (ref 30–99)
ALT SERPL-CCNC: 25 U/L (ref 2–50)
ANION GAP SERPL CALC-SCNC: 6 MMOL/L (ref 0–11.9)
AST SERPL-CCNC: 23 U/L (ref 12–45)
BILIRUB SERPL-MCNC: 0.8 MG/DL (ref 0.1–1.5)
BUN SERPL-MCNC: 14 MG/DL (ref 8–22)
CALCIUM SERPL-MCNC: 9.6 MG/DL (ref 8.5–10.5)
CHLORIDE SERPL-SCNC: 106 MMOL/L (ref 96–112)
CHOLEST SERPL-MCNC: 153 MG/DL (ref 100–199)
CO2 SERPL-SCNC: 27 MMOL/L (ref 20–33)
CREAT SERPL-MCNC: 0.87 MG/DL (ref 0.5–1.4)
EST. AVERAGE GLUCOSE BLD GHB EST-MCNC: 134 MG/DL
FASTING STATUS PATIENT QL REPORTED: NORMAL
GLOBULIN SER CALC-MCNC: 2.5 G/DL (ref 1.9–3.5)
GLUCOSE SERPL-MCNC: 101 MG/DL (ref 65–99)
HBA1C MFR BLD: 6.3 % (ref 0–5.6)
HDLC SERPL-MCNC: 65 MG/DL
LDLC SERPL CALC-MCNC: 73 MG/DL
POTASSIUM SERPL-SCNC: 4.2 MMOL/L (ref 3.6–5.5)
PROT SERPL-MCNC: 7.1 G/DL (ref 6–8.2)
SODIUM SERPL-SCNC: 139 MMOL/L (ref 135–145)
TRIGL SERPL-MCNC: 73 MG/DL (ref 0–149)

## 2019-03-08 PROCEDURE — 80061 LIPID PANEL: CPT

## 2019-03-08 PROCEDURE — 80053 COMPREHEN METABOLIC PANEL: CPT

## 2019-03-08 PROCEDURE — 83036 HEMOGLOBIN GLYCOSYLATED A1C: CPT

## 2019-03-08 PROCEDURE — 36415 COLL VENOUS BLD VENIPUNCTURE: CPT

## 2019-03-12 ENCOUNTER — OFFICE VISIT (OUTPATIENT)
Dept: MEDICAL GROUP | Facility: PHYSICIAN GROUP | Age: 59
End: 2019-03-12
Payer: COMMERCIAL

## 2019-03-12 VITALS
TEMPERATURE: 98.8 F | HEART RATE: 67 BPM | SYSTOLIC BLOOD PRESSURE: 120 MMHG | OXYGEN SATURATION: 98 % | DIASTOLIC BLOOD PRESSURE: 70 MMHG | WEIGHT: 159.17 LBS | HEIGHT: 69 IN | BODY MASS INDEX: 23.58 KG/M2

## 2019-03-12 DIAGNOSIS — Z12.5 SCREENING FOR PROSTATE CANCER: ICD-10-CM

## 2019-03-12 DIAGNOSIS — E78.5 DYSLIPIDEMIA: ICD-10-CM

## 2019-03-12 DIAGNOSIS — J06.9 ACUTE URI: ICD-10-CM

## 2019-03-12 DIAGNOSIS — R73.01 IMPAIRED FASTING BLOOD SUGAR: ICD-10-CM

## 2019-03-12 PROCEDURE — 99214 OFFICE O/P EST MOD 30 MIN: CPT | Performed by: FAMILY MEDICINE

## 2019-03-12 RX ORDER — ATORVASTATIN CALCIUM 20 MG/1
20 TABLET, FILM COATED ORAL EVERY EVENING
Qty: 90 TAB | Refills: 1 | Status: SHIPPED | OUTPATIENT
Start: 2019-03-12 | End: 2019-09-17 | Stop reason: SDUPTHER

## 2019-03-12 ASSESSMENT — PATIENT HEALTH QUESTIONNAIRE - PHQ9: CLINICAL INTERPRETATION OF PHQ2 SCORE: 0

## 2019-03-12 NOTE — PROGRESS NOTES
"Subjective:      Leander Saxena is a 59 y.o. male who presents with Hyperlipidemia        HPI:    Dyslipidemia  He has been taking atorvastatin 20 mg over the last 4 months for treatment of dyslipidemia. Blood work from March 8th 2019 showed a total cholesterol of 153, and HDL of 65 and an LDL of 73. This is improved from September 2018 prior to treatment with atorvastatin. He denies any side effects from the medication.     Impaired fasting blood sugar  His blood work from March 8th 2019 showed a fasting blood sugar of 101 and an A1C of 6.3. He does not drink soda but admits to eating high-sugar foods frequently.     Health maintenance  His vaccinations are up to date aside from Zoster which he cannot receive due to a shortage. His next colonscopy is scheduled for 2023.     Cough  He reports a cough over 3-4 days with light yellow sputum production. His cough makes it difficult to sleep and he requests a night time cough medication. He denies fever, chills, nasal congestion, shortness of breath, wheezing.     Past medical history, past surgical history, family history reviewed-no changes    Social history reviewed-no changes    Allergies reviewed-no changes    Medications reviewed-no changes     ROS:  As per the HPI as shown above, the rest are negative.       Objective:     /70 (BP Location: Left arm, Patient Position: Sitting, BP Cuff Size: Adult)   Pulse 67   Temp 37.1 °C (98.8 °F) (Temporal)   Ht 1.753 m (5' 9\")   Wt 72.2 kg (159 lb 2.8 oz)   SpO2 98%   BMI 23.51 kg/m²     Physical Exam  Examined alert, awake, oriented, not in distress    Ears-bilateral TM intact without signs of infection  Nose-turbinates normal without swelling, discharge, or obstruction   Throat-tonsils are normal without enlargement, exudate, or erythema   Neck-supple, no lymphadenopathy, no thyromegaly  Lungs-clear to auscultation, no rales, no wheezes  Heart-regular rate and rhythm, no murmur  Extremities-no edema, clubbing, " cyanosis      Labs:  Hospital Outpatient Visit on 03/08/2019   Component Date Value Ref Range Status   • Cholesterol,Tot 03/08/2019 153  100 - 199 mg/dL Final   • Triglycerides 03/08/2019 73  0 - 149 mg/dL Final   • HDL 03/08/2019 65  >=40 mg/dL Final   • LDL 03/08/2019 73  <100 mg/dL Final   • Sodium 03/08/2019 139  135 - 145 mmol/L Final   • Potassium 03/08/2019 4.2  3.6 - 5.5 mmol/L Final   • Chloride 03/08/2019 106  96 - 112 mmol/L Final   • Co2 03/08/2019 27  20 - 33 mmol/L Final   • Anion Gap 03/08/2019 6.0  0.0 - 11.9 Final   • Glucose 03/08/2019 101* 65 - 99 mg/dL Final   • Bun 03/08/2019 14  8 - 22 mg/dL Final   • Creatinine 03/08/2019 0.87  0.50 - 1.40 mg/dL Final   • Calcium 03/08/2019 9.6  8.5 - 10.5 mg/dL Final   • AST(SGOT) 03/08/2019 23  12 - 45 U/L Final   • ALT(SGPT) 03/08/2019 25  2 - 50 U/L Final   • Alkaline Phosphatase 03/08/2019 52  30 - 99 U/L Final   • Total Bilirubin 03/08/2019 0.8  0.1 - 1.5 mg/dL Final   • Albumin 03/08/2019 4.6  3.2 - 4.9 g/dL Final   • Total Protein 03/08/2019 7.1  6.0 - 8.2 g/dL Final   • Globulin 03/08/2019 2.5  1.9 - 3.5 g/dL Final   • A-G Ratio 03/08/2019 1.8  g/dL Final   • Glycohemoglobin 03/08/2019 6.3* 0.0 - 5.6 % Final    Comment: Increased risk for diabetes:  5.7 -6.4%  Diabetes:  >6.4%  Glycemic control for adults with diabetes:  <7.0%  The above interpretations are per ADA guidelines.  Diagnosis  of diabetes mellitus on the basis of elevated Hemoglobin A1c  should be confirmed by repeating the Hb A1c test.     • Est Avg Glucose 03/08/2019 134  mg/dL Final    Comment: The eAG calculation is based on the A1c-Derived Daily Glucose  (ADAG) study.  See the ADA's website for additional information.     • Fasting Status 03/08/2019 Fasting   Final   • GFR If  03/08/2019 >60  >60 mL/min/1.73 m 2 Final   • GFR If Non  03/08/2019 >60  >60 mL/min/1.73 m 2 Final             Assessment/Plan:     1. Dyslipidemia  - atorvastatin (LIPITOR) 20  MG Tab; Take 1 Tab by mouth every evening.  Dispense: 90 Tab; Refill: 1  He has been taking atorvastatin 20 mg over the last 4 months for treatment. Blood work from March 8th 2019 showed a total cholesterol of 153, and HDL of 65 and an LDL of 73. This is significantly improved from prior blood work in September 2018 and now all at target. He will continue the medication regimen.   - Lipid Profile; Future  - Comp Metabolic Panel; Future  - HEMOGLOBIN A1C; Future  - CBC WITH DIFFERENTIAL; Future  - PROSTATE SPECIFIC AG SCREENING; Future    2. Impaired fasting blood sugar  His blood work from March 8th 2019 showed a fasting blood sugar of 101 and an A1C of 6.3. Advised that he cuts down on high-sugar foods and keep up adequate exercise.   - Lipid Profile; Future  - Comp Metabolic Panel; Future  - HEMOGLOBIN A1C; Future  - CBC WITH DIFFERENTIAL; Future  - PROSTATE SPECIFIC AG SCREENING; Future    3. Screening for prostate cancer  We will do screening PSA next blood work.  - Lipid Profile; Future  - Comp Metabolic Panel; Future  - HEMOGLOBIN A1C; Future  - CBC WITH DIFFERENTIAL; Future  - PROSTATE SPECIFIC AG SCREENING; Future    4. Acute URI  He reports a productive cough over the last 3-4 days which is likely due to a virus. He will be prescribed Tussionex 10-8 mg to help with his symptoms 1 teaspoon twice a day. Advised rest and plenty of fluids.  Advised not to drive if this causes grogginess or dizziness and to take it only at night if this happens.  If his cough does not improve or resolve after a week, he agrees to contact me for antibiotics.   - Hydrocod Polst-CPM Polst ER (TUSSIONEX) 10-8 MG/5ML   Suspension Extended Release; Take 5 mL by mouth every 12 hours for 7 days.  Dispense: 70 mL; Refill: 0      Russ SMITH (Scribe), am scribing for, and in the presence of, Marcela Tomas MD     Electronically signed by: Russ Sims (Elayne), 3/12/2019    Marcela SMITH MD personally performed the  services described in this documentation, as scribed by Russ Sims in my presence, and it is both accurate and complete.

## 2019-07-22 ENCOUNTER — OFFICE VISIT (OUTPATIENT)
Dept: MEDICAL GROUP | Facility: PHYSICIAN GROUP | Age: 59
End: 2019-07-22
Payer: COMMERCIAL

## 2019-07-22 ENCOUNTER — HOSPITAL ENCOUNTER (OUTPATIENT)
Dept: RADIOLOGY | Facility: MEDICAL CENTER | Age: 59
End: 2019-07-22
Attending: FAMILY MEDICINE
Payer: COMMERCIAL

## 2019-07-22 VITALS
DIASTOLIC BLOOD PRESSURE: 60 MMHG | TEMPERATURE: 97.9 F | WEIGHT: 155.65 LBS | BODY MASS INDEX: 23.05 KG/M2 | HEART RATE: 62 BPM | OXYGEN SATURATION: 99 % | HEIGHT: 69 IN | SYSTOLIC BLOOD PRESSURE: 100 MMHG

## 2019-07-22 DIAGNOSIS — M25.562 ACUTE PAIN OF LEFT KNEE: ICD-10-CM

## 2019-07-22 PROCEDURE — 99213 OFFICE O/P EST LOW 20 MIN: CPT | Performed by: FAMILY MEDICINE

## 2019-07-22 PROCEDURE — 73564 X-RAY EXAM KNEE 4 OR MORE: CPT | Mod: LT

## 2019-07-22 NOTE — PROGRESS NOTES
"Subjective:      Leander Saxena is a 59 y.o. male who presents with Pain (knee)      HPI:    Patient presents today with complaints of left knee pain acute onset 3 weeks ago while standing up and turning to the left. He states the pain is mild, intermittent, and localizes it to the inner portion of his knee. The pain is present at rest as well and exacerbated upon applying pressure while standing. He has not had any prior injuries or issues of his knee.  He has been taking Advil BID 2 tablets daily with improvement of his pain. Denies any swelling or erythema. Denies any recent falls, trauma, or strenuous activity.       Past medical history, past surgical history, family history reviewed-no changes    Social history reviewed-no changes    Allergies reviewed-no changes    Medications reviewed-no changes       ROS:  As per the HPI as shown above, the rest are negative.       Objective:     /60 (BP Location: Left arm, Patient Position: Sitting, BP Cuff Size: Adult)   Pulse 62   Temp 36.6 °C (97.9 °F) (Temporal)   Ht 1.753 m (5' 9\")   Wt 70.6 kg (155 lb 10.3 oz)   SpO2 99%   BMI 22.98 kg/m²     Physical Exam    Examined alert, awake, oriented, not in distress    Left knee-no swelling, no erythema, no effusion, no pinpoint tenderness. Full range of motion. no laxity or instability.       Assessment/Plan:     1. Acute pain of left knee  This is an acute issue. There is no laxity or sweliing seen upon examination. Xray ordered to further evaluate. Advised applying a heating pad for 15 minutes TID. Continue treating pain with Advil. We will contact him in regards to his results. The likelihood of a ligament tear or meniscus tear is very low. He will most likely only require treatment through physical therapy.   - DX-KNEE COMPLETE 4+ LEFT; Future       I, Darnell Szymanski (Scribe), am scribing for, and in the presence of, Marcela Tomas MD     Electronically signed by: Darnell Szymanski (Scribe), 7/22/2019    I, " Marcela Tomas MD personally performed the services described in this documentation, as scribed by Darnell Szymanski in my presence, and it is both accurate and complete.

## 2019-09-12 ENCOUNTER — HOSPITAL ENCOUNTER (OUTPATIENT)
Dept: LAB | Facility: MEDICAL CENTER | Age: 59
End: 2019-09-12
Attending: FAMILY MEDICINE
Payer: COMMERCIAL

## 2019-09-12 DIAGNOSIS — R73.01 IMPAIRED FASTING BLOOD SUGAR: ICD-10-CM

## 2019-09-12 DIAGNOSIS — E78.5 DYSLIPIDEMIA: ICD-10-CM

## 2019-09-12 DIAGNOSIS — Z12.5 SCREENING FOR PROSTATE CANCER: ICD-10-CM

## 2019-09-12 LAB
ALBUMIN SERPL BCP-MCNC: 4.6 G/DL (ref 3.2–4.9)
ALBUMIN/GLOB SERPL: 1.7 G/DL
ALP SERPL-CCNC: 47 U/L (ref 30–99)
ALT SERPL-CCNC: 18 U/L (ref 2–50)
ANION GAP SERPL CALC-SCNC: 10 MMOL/L (ref 0–11.9)
AST SERPL-CCNC: 19 U/L (ref 12–45)
BASOPHILS # BLD AUTO: 1.4 % (ref 0–1.8)
BASOPHILS # BLD: 0.08 K/UL (ref 0–0.12)
BILIRUB SERPL-MCNC: 0.8 MG/DL (ref 0.1–1.5)
BUN SERPL-MCNC: 15 MG/DL (ref 8–22)
CALCIUM SERPL-MCNC: 9.7 MG/DL (ref 8.5–10.5)
CHLORIDE SERPL-SCNC: 104 MMOL/L (ref 96–112)
CHOLEST SERPL-MCNC: 215 MG/DL (ref 100–199)
CO2 SERPL-SCNC: 27 MMOL/L (ref 20–33)
CREAT SERPL-MCNC: 0.88 MG/DL (ref 0.5–1.4)
EOSINOPHIL # BLD AUTO: 0.29 K/UL (ref 0–0.51)
EOSINOPHIL NFR BLD: 5 % (ref 0–6.9)
ERYTHROCYTE [DISTWIDTH] IN BLOOD BY AUTOMATED COUNT: 43.8 FL (ref 35.9–50)
EST. AVERAGE GLUCOSE BLD GHB EST-MCNC: 120 MG/DL
FASTING STATUS PATIENT QL REPORTED: NORMAL
GLOBULIN SER CALC-MCNC: 2.7 G/DL (ref 1.9–3.5)
GLUCOSE SERPL-MCNC: 98 MG/DL (ref 65–99)
HBA1C MFR BLD: 5.8 % (ref 0–5.6)
HCT VFR BLD AUTO: 50.8 % (ref 42–52)
HDLC SERPL-MCNC: 56 MG/DL
HGB BLD-MCNC: 15.8 G/DL (ref 14–18)
IMM GRANULOCYTES # BLD AUTO: 0 K/UL (ref 0–0.11)
IMM GRANULOCYTES NFR BLD AUTO: 0 % (ref 0–0.9)
LDLC SERPL CALC-MCNC: 133 MG/DL
LYMPHOCYTES # BLD AUTO: 2.27 K/UL (ref 1–4.8)
LYMPHOCYTES NFR BLD: 39.1 % (ref 22–41)
MCH RBC QN AUTO: 26.6 PG (ref 27–33)
MCHC RBC AUTO-ENTMCNC: 31.1 G/DL (ref 33.7–35.3)
MCV RBC AUTO: 85.7 FL (ref 81.4–97.8)
MONOCYTES # BLD AUTO: 0.58 K/UL (ref 0–0.85)
MONOCYTES NFR BLD AUTO: 10 % (ref 0–13.4)
NEUTROPHILS # BLD AUTO: 2.58 K/UL (ref 1.82–7.42)
NEUTROPHILS NFR BLD: 44.5 % (ref 44–72)
NRBC # BLD AUTO: 0 K/UL
NRBC BLD-RTO: 0 /100 WBC
PLATELET # BLD AUTO: 246 K/UL (ref 164–446)
PMV BLD AUTO: 8.8 FL (ref 9–12.9)
POTASSIUM SERPL-SCNC: 3.9 MMOL/L (ref 3.6–5.5)
PROT SERPL-MCNC: 7.3 G/DL (ref 6–8.2)
PSA SERPL-MCNC: 1.23 NG/ML (ref 0–4)
RBC # BLD AUTO: 5.93 M/UL (ref 4.7–6.1)
SODIUM SERPL-SCNC: 141 MMOL/L (ref 135–145)
TRIGL SERPL-MCNC: 131 MG/DL (ref 0–149)
WBC # BLD AUTO: 5.8 K/UL (ref 4.8–10.8)

## 2019-09-12 PROCEDURE — 84153 ASSAY OF PSA TOTAL: CPT

## 2019-09-12 PROCEDURE — 80053 COMPREHEN METABOLIC PANEL: CPT

## 2019-09-12 PROCEDURE — 36415 COLL VENOUS BLD VENIPUNCTURE: CPT

## 2019-09-12 PROCEDURE — 80061 LIPID PANEL: CPT

## 2019-09-12 PROCEDURE — 83036 HEMOGLOBIN GLYCOSYLATED A1C: CPT

## 2019-09-12 PROCEDURE — 85025 COMPLETE CBC W/AUTO DIFF WBC: CPT

## 2019-09-17 ENCOUNTER — OFFICE VISIT (OUTPATIENT)
Dept: MEDICAL GROUP | Facility: PHYSICIAN GROUP | Age: 59
End: 2019-09-17
Payer: COMMERCIAL

## 2019-09-17 VITALS
WEIGHT: 158.51 LBS | BODY MASS INDEX: 23.48 KG/M2 | HEIGHT: 69 IN | HEART RATE: 66 BPM | DIASTOLIC BLOOD PRESSURE: 80 MMHG | OXYGEN SATURATION: 97 % | SYSTOLIC BLOOD PRESSURE: 130 MMHG | TEMPERATURE: 98.5 F

## 2019-09-17 DIAGNOSIS — Z23 NEED FOR IMMUNIZATION AGAINST INFLUENZA: ICD-10-CM

## 2019-09-17 DIAGNOSIS — E78.5 DYSLIPIDEMIA: ICD-10-CM

## 2019-09-17 DIAGNOSIS — R73.01 IMPAIRED FASTING BLOOD SUGAR: ICD-10-CM

## 2019-09-17 PROCEDURE — 90686 IIV4 VACC NO PRSV 0.5 ML IM: CPT | Performed by: FAMILY MEDICINE

## 2019-09-17 PROCEDURE — 90471 IMMUNIZATION ADMIN: CPT | Performed by: FAMILY MEDICINE

## 2019-09-17 PROCEDURE — 99213 OFFICE O/P EST LOW 20 MIN: CPT | Mod: 25 | Performed by: FAMILY MEDICINE

## 2019-09-17 RX ORDER — ATORVASTATIN CALCIUM 20 MG/1
20 TABLET, FILM COATED ORAL EVERY EVENING
Qty: 90 TAB | Refills: 1 | Status: SHIPPED | OUTPATIENT
Start: 2019-09-17 | End: 2020-01-27 | Stop reason: SDUPTHER

## 2019-09-17 NOTE — PROGRESS NOTES
"Subjective:      Leander Saxena is a 59 y.o. male who presents with Hyperlipidemia    HPI:    The patient is here for follow up on his chronic medical problems.    Dyslipidemia  He was previously taking Atorvastatin 20 mg for his dyslipidemia without myalgias or other side effects. However, he stopped in June (3 months ago) because he did not pickup his refills. He has been trying to manage this through his own efforts including eating healthy and exercising regularly. Blood work was done before this visit.    Impaired fasting blood sugar  He continues to manage this through his own efforts. Blood work was completed prior to this visit.     Acute left knee pain (resolved)  I last saw the patient in July for acute concerns of left knee. The xray did not show any fracture or dislocation, but there was mild arthritis. This was likely a knee sprain.  I referred him for physical therapy but he did not go and did his own PT exercises that he found online. Patient states today that his pain has resolved.    Health Maintenance  Patient is a requesting a flu shot today.       Past medical history, past surgical history, family history reviewed-no changes    Social history reviewed-no changes    Allergies reviewed-no changes    Medications reviewed-no changes      ROS:  As per the HPI as shown above, the rest are negative.       Objective:     /80 (BP Location: Left arm, Patient Position: Sitting, BP Cuff Size: Adult)   Pulse 66   Temp 36.9 °C (98.5 °F) (Temporal)   Ht 1.753 m (5' 9\")   Wt 71.9 kg (158 lb 8.2 oz)   SpO2 97%   BMI 23.41 kg/m²     Physical Exam    Examined alert, awake, oriented, not in distress    Neck-supple, no lymphadenopathy, no thyromegaly  Lungs-clear to auscultation, no rales, no wheezes  Heart-regular rate and rhythm, no murmur  Extremities-no edema, clubbing, cyanosis     Labs:  Hospital Outpatient Visit on 09/12/2019   Component Date Value Ref Range Status   • Prostatic Specific Antigen " Tot 09/12/2019 1.23  0.00 - 4.00 ng/mL Final    Comment: The Access [employer] PSA assay is a paramagnetic particle,  chemiluminescent immunoassay for the quantitative determination  of total prostate specific antigen (PSA) levels using the  [employer] Immunoassay System. Values obtained with different  methods cannot be used interchangeably for patient monitoring.     • WBC 09/12/2019 5.8  4.8 - 10.8 K/uL Final   • RBC 09/12/2019 5.93  4.70 - 6.10 M/uL Final   • Hemoglobin 09/12/2019 15.8  14.0 - 18.0 g/dL Final   • Hematocrit 09/12/2019 50.8  42.0 - 52.0 % Final   • MCV 09/12/2019 85.7  81.4 - 97.8 fL Final   • MCH 09/12/2019 26.6* 27.0 - 33.0 pg Final   • MCHC 09/12/2019 31.1* 33.7 - 35.3 g/dL Final   • RDW 09/12/2019 43.8  35.9 - 50.0 fL Final   • Platelet Count 09/12/2019 246  164 - 446 K/uL Final   • MPV 09/12/2019 8.8* 9.0 - 12.9 fL Final   • Neutrophils-Polys 09/12/2019 44.50  44.00 - 72.00 % Final   • Lymphocytes 09/12/2019 39.10  22.00 - 41.00 % Final   • Monocytes 09/12/2019 10.00  0.00 - 13.40 % Final   • Eosinophils 09/12/2019 5.00  0.00 - 6.90 % Final   • Basophils 09/12/2019 1.40  0.00 - 1.80 % Final   • Immature Granulocytes 09/12/2019 0.00  0.00 - 0.90 % Final   • Nucleated RBC 09/12/2019 0.00  /100 WBC Final   • Neutrophils (Absolute) 09/12/2019 2.58  1.82 - 7.42 K/uL Final    Includes immature neutrophils, if present.   • Lymphs (Absolute) 09/12/2019 2.27  1.00 - 4.80 K/uL Final   • Monos (Absolute) 09/12/2019 0.58  0.00 - 0.85 K/uL Final   • Eos (Absolute) 09/12/2019 0.29  0.00 - 0.51 K/uL Final   • Baso (Absolute) 09/12/2019 0.08  0.00 - 0.12 K/uL Final   • Immature Granulocytes (abs) 09/12/2019 0.00  0.00 - 0.11 K/uL Final   • NRBC (Absolute) 09/12/2019 0.00  K/uL Final   • Glycohemoglobin 09/12/2019 5.8* 0.0 - 5.6 % Final    Comment: Increased risk for diabetes:  5.7 -6.4%  Diabetes:  >6.4%  Glycemic control for adults with diabetes:  <7.0%  The above interpretations are per ADA guidelines.   Diagnosis  of diabetes mellitus on the basis of elevated Hemoglobin A1c  should be confirmed by repeating the Hb A1c test.     • Est Avg Glucose 09/12/2019 120  mg/dL Final    Comment: The eAG calculation is based on the A1c-Derived Daily Glucose  (ADAG) study.  See the ADA's website for additional information.     • Sodium 09/12/2019 141  135 - 145 mmol/L Final   • Potassium 09/12/2019 3.9  3.6 - 5.5 mmol/L Final   • Chloride 09/12/2019 104  96 - 112 mmol/L Final   • Co2 09/12/2019 27  20 - 33 mmol/L Final   • Anion Gap 09/12/2019 10.0  0.0 - 11.9 Final   • Glucose 09/12/2019 98  65 - 99 mg/dL Final   • Bun 09/12/2019 15  8 - 22 mg/dL Final   • Creatinine 09/12/2019 0.88  0.50 - 1.40 mg/dL Final   • Calcium 09/12/2019 9.7  8.5 - 10.5 mg/dL Final   • AST(SGOT) 09/12/2019 19  12 - 45 U/L Final   • ALT(SGPT) 09/12/2019 18  2 - 50 U/L Final   • Alkaline Phosphatase 09/12/2019 47  30 - 99 U/L Final   • Total Bilirubin 09/12/2019 0.8  0.1 - 1.5 mg/dL Final   • Albumin 09/12/2019 4.6  3.2 - 4.9 g/dL Final   • Total Protein 09/12/2019 7.3  6.0 - 8.2 g/dL Final   • Globulin 09/12/2019 2.7  1.9 - 3.5 g/dL Final   • A-G Ratio 09/12/2019 1.7  g/dL Final   • Cholesterol,Tot 09/12/2019 215* 100 - 199 mg/dL Final   • Triglycerides 09/12/2019 131  0 - 149 mg/dL Final   • HDL 09/12/2019 56  >=40 mg/dL Final   • LDL 09/12/2019 133* <100 mg/dL Final   • Fasting Status 09/12/2019 Fasting   Final   • GFR If  09/12/2019 >60  >60 mL/min/1.73 m 2 Final   • GFR If Non  09/12/2019 >60  >60 mL/min/1.73 m 2 Final        Assessment/Plan:     1. Dyslipidemia  Patient stopped taking his statin 3 months ago because he did not  his refills.  He said he only took it for 3 months.  He did not have any side effects.  His lipid panel showed an increase in his total cholesterol from 153 to 215 and LDL from 73 to 133. Both were previously controlled while on medications, but they are now above normal. We discussed  the importance of staying on this medication, particularly because his 10-year ASCVD risk score is above the 7.5% threshold for statin treatment at 13.8%. Patient agrees, and he will resume taking it. I have sent in refills. Labs have been ordered for the next follow up visit.   - atorvastatin (LIPITOR) 20 MG Tab; Take 1 Tab by mouth every evening.  Dispense: 90 Tab; Refill: 1  - Lipid Profile; Future  - Comp Metabolic Panel; Future    2. Impaired fasting blood sugar  His A1C has improved from 6.3 to 5.8. This remains in the prediabetic range, so we still do not need to start him on any medications at this time. I advised him to make diet changes to improve his glucose levels. Advised him to avoid sweets, decrease his carbohydrate intake, exercise regularly and keep a healthy weight. Labs have been ordered for the next follow up visit.   - Lipid Profile; Future  - Comp Metabolic Panel; Future    3. Need for immunization against influenza  Flu vaccination given in the office today.   - Influenza Vaccine Quad Injection (PF)      Darnell SMITH (Scribe), am scribing for, and in the presence of, Marcela Tomas MD     Electronically signed by: Darnell Szymanski (Scribe), 9/17/2019    IMarcela MD personally performed the services described in this documentation, as scribed by Darnell Szymanski in my presence, and it is both accurate and complete.

## 2019-12-17 ENCOUNTER — APPOINTMENT (OUTPATIENT)
Dept: MEDICAL GROUP | Facility: PHYSICIAN GROUP | Age: 59
End: 2019-12-17
Payer: COMMERCIAL

## 2020-01-21 ENCOUNTER — HOSPITAL ENCOUNTER (OUTPATIENT)
Dept: LAB | Facility: MEDICAL CENTER | Age: 60
End: 2020-01-21
Attending: FAMILY MEDICINE
Payer: COMMERCIAL

## 2020-01-21 DIAGNOSIS — E78.5 DYSLIPIDEMIA: ICD-10-CM

## 2020-01-21 DIAGNOSIS — R73.01 IMPAIRED FASTING BLOOD SUGAR: ICD-10-CM

## 2020-01-21 LAB
ALBUMIN SERPL BCP-MCNC: 4.4 G/DL (ref 3.2–4.9)
ALBUMIN/GLOB SERPL: 1.5 G/DL
ALP SERPL-CCNC: 45 U/L (ref 30–99)
ALT SERPL-CCNC: 19 U/L (ref 2–50)
ANION GAP SERPL CALC-SCNC: 5 MMOL/L (ref 0–11.9)
AST SERPL-CCNC: 19 U/L (ref 12–45)
BILIRUB SERPL-MCNC: 0.9 MG/DL (ref 0.1–1.5)
BUN SERPL-MCNC: 15 MG/DL (ref 8–22)
CALCIUM SERPL-MCNC: 9.5 MG/DL (ref 8.5–10.5)
CHLORIDE SERPL-SCNC: 106 MMOL/L (ref 96–112)
CHOLEST SERPL-MCNC: 150 MG/DL (ref 100–199)
CO2 SERPL-SCNC: 28 MMOL/L (ref 20–33)
CREAT SERPL-MCNC: 0.87 MG/DL (ref 0.5–1.4)
FASTING STATUS PATIENT QL REPORTED: NORMAL
GLOBULIN SER CALC-MCNC: 3 G/DL (ref 1.9–3.5)
GLUCOSE SERPL-MCNC: 93 MG/DL (ref 65–99)
HDLC SERPL-MCNC: 54 MG/DL
LDLC SERPL CALC-MCNC: 80 MG/DL
POTASSIUM SERPL-SCNC: 3.8 MMOL/L (ref 3.6–5.5)
PROT SERPL-MCNC: 7.4 G/DL (ref 6–8.2)
SODIUM SERPL-SCNC: 139 MMOL/L (ref 135–145)
TRIGL SERPL-MCNC: 81 MG/DL (ref 0–149)

## 2020-01-21 PROCEDURE — 36415 COLL VENOUS BLD VENIPUNCTURE: CPT

## 2020-01-21 PROCEDURE — 80053 COMPREHEN METABOLIC PANEL: CPT

## 2020-01-21 PROCEDURE — 80061 LIPID PANEL: CPT

## 2020-01-27 ENCOUNTER — OFFICE VISIT (OUTPATIENT)
Dept: MEDICAL GROUP | Facility: PHYSICIAN GROUP | Age: 60
End: 2020-01-27
Payer: COMMERCIAL

## 2020-01-27 VITALS
HEART RATE: 84 BPM | DIASTOLIC BLOOD PRESSURE: 60 MMHG | SYSTOLIC BLOOD PRESSURE: 134 MMHG | BODY MASS INDEX: 21.88 KG/M2 | HEIGHT: 69 IN | TEMPERATURE: 97.9 F | OXYGEN SATURATION: 97 % | WEIGHT: 147.71 LBS

## 2020-01-27 DIAGNOSIS — Z23 NEED FOR 23-POLYVALENT PNEUMOCOCCAL POLYSACCHARIDE VACCINE: ICD-10-CM

## 2020-01-27 DIAGNOSIS — Z23 NEED FOR SHINGLES VACCINE: ICD-10-CM

## 2020-01-27 DIAGNOSIS — E78.5 DYSLIPIDEMIA: ICD-10-CM

## 2020-01-27 DIAGNOSIS — Z11.59 NEED FOR HEPATITIS C SCREENING TEST: ICD-10-CM

## 2020-01-27 DIAGNOSIS — R73.01 IMPAIRED FASTING BLOOD SUGAR: ICD-10-CM

## 2020-01-27 PROCEDURE — 90472 IMMUNIZATION ADMIN EACH ADD: CPT | Performed by: FAMILY MEDICINE

## 2020-01-27 PROCEDURE — 99214 OFFICE O/P EST MOD 30 MIN: CPT | Mod: 25 | Performed by: FAMILY MEDICINE

## 2020-01-27 PROCEDURE — 90732 PPSV23 VACC 2 YRS+ SUBQ/IM: CPT | Performed by: FAMILY MEDICINE

## 2020-01-27 PROCEDURE — 90471 IMMUNIZATION ADMIN: CPT | Performed by: FAMILY MEDICINE

## 2020-01-27 PROCEDURE — 90750 HZV VACC RECOMBINANT IM: CPT | Performed by: FAMILY MEDICINE

## 2020-01-27 RX ORDER — ATORVASTATIN CALCIUM 20 MG/1
20 TABLET, FILM COATED ORAL EVERY EVENING
Qty: 90 TAB | Refills: 1 | Status: SHIPPED | OUTPATIENT
Start: 2020-01-27 | End: 2020-08-03 | Stop reason: SDUPTHER

## 2020-01-27 ASSESSMENT — PATIENT HEALTH QUESTIONNAIRE - PHQ9: CLINICAL INTERPRETATION OF PHQ2 SCORE: 0

## 2020-01-27 NOTE — PROGRESS NOTES
"Subjective:      Leander Saxena is a 59 y.o. male who presents with Results (labs)      HPI:    The patient is here for follow up on his chronic medical problems.    Dyslipidemia  On his last visit, he had stopped taking the atorvastatin for 3 months as he did not  his prescription, and he was trying to manage this through his own efforts. However, his lipid panel showed an increase in his LDL, and his 10-year ASCVD risk was high at 13.8%. Therefore, we started him back on Atorvastatin 20 mg, and he has been taking this for the past 4 months without any myalgias or other side effects. However, he did stop taking this again 2 days ago when he saw that his most recent lipid panel came back all at target. He has also been exercising and improving his diet such as decreasing his rice intake, so his weight has decreased by 11 pounds.     Impaired fasting blood sugar  He continues to manage this through his own efforts. His last labs in 9/2019 showed an improvement in his A1C from 6.3 to 5.8.    Health Maintenance   He would like the Shingrix vaccination today. He qualifies for a Hepatitis C screening based on his age and CDC guidelines. He also qualifies for a Pneumovax-23 based on his history of smoking; he continues to smoke 3 cigarettes a day as he has for 40 years in total. He is not interested in quitting at this time.        Past medical history, past surgical history, family history reviewed-no changes    Social history reviewed-no changes    Allergies reviewed-no changes    Medications reviewed-no changes      ROS:  As per the HPI as shown above, the rest are negative.       Objective:     /60 (BP Location: Left arm, Patient Position: Sitting, BP Cuff Size: Adult)   Pulse 84   Temp 36.6 °C (97.9 °F) (Temporal)   Ht 1.753 m (5' 9\")   Wt 67 kg (147 lb 11.3 oz)   SpO2 97%   BMI 21.81 kg/m²     Physical Exam    Examined alert, awake, oriented, not in distress    Neck-supple, no lymphadenopathy, " no thyromegaly  Lungs-clear to auscultation, no rales, no wheezes  Heart-regular rate and rhythm, no murmur  Extremities-no edema, clubbing, cyanosis       Labs:  Hospital Outpatient Visit on 01/21/2020   Component Date Value Ref Range Status   • Sodium 01/21/2020 139  135 - 145 mmol/L Final   • Potassium 01/21/2020 3.8  3.6 - 5.5 mmol/L Final   • Chloride 01/21/2020 106  96 - 112 mmol/L Final   • Co2 01/21/2020 28  20 - 33 mmol/L Final   • Anion Gap 01/21/2020 5.0  0.0 - 11.9 Final   • Glucose 01/21/2020 93  65 - 99 mg/dL Final   • Bun 01/21/2020 15  8 - 22 mg/dL Final   • Creatinine 01/21/2020 0.87  0.50 - 1.40 mg/dL Final   • Calcium 01/21/2020 9.5  8.5 - 10.5 mg/dL Final   • AST(SGOT) 01/21/2020 19  12 - 45 U/L Final   • ALT(SGPT) 01/21/2020 19  2 - 50 U/L Final   • Alkaline Phosphatase 01/21/2020 45  30 - 99 U/L Final   • Total Bilirubin 01/21/2020 0.9  0.1 - 1.5 mg/dL Final   • Albumin 01/21/2020 4.4  3.2 - 4.9 g/dL Final   • Total Protein 01/21/2020 7.4  6.0 - 8.2 g/dL Final   • Globulin 01/21/2020 3.0  1.9 - 3.5 g/dL Final   • A-G Ratio 01/21/2020 1.5  g/dL Final   • Cholesterol,Tot 01/21/2020 150  100 - 199 mg/dL Final   • Triglycerides 01/21/2020 81  0 - 149 mg/dL Final   • HDL 01/21/2020 54  >=40 mg/dL Final   • LDL 01/21/2020 80  <100 mg/dL Final   • Fasting Status 01/21/2020 Fasting   Final   • GFR If  01/21/2020 >60  >60 mL/min/1.73 m 2 Final   • GFR If Non  01/21/2020 >60  >60 mL/min/1.73 m 2 Final        Assessment/Plan:     1. Dyslipidemia  Since restarting his Atorvastatin, his lipid panel has entirely returned to normal. His total cholesterol decreased from 215-150 and LDL decreased from 133 to 80. I urged him to continue taking the Atorvastatin and that he will likely need to be on this medication for the rest of his life.  He was made aware that his numbers will probably go up again without the medication which has happened before.  I told him if his LDL  cholesterol drops below 50 then we may have to consider taking him off medication at that time.  Labs have been ordered for the next follow up visit.   - atorvastatin (LIPITOR) 20 MG Tab; Take 1 Tab by mouth every evening.  Dispense: 90 Tab; Refill: 1  - Lipid Profile; Future  - Comp Metabolic Panel; Future  - HEMOGLOBIN A1C; Future  - CBC WITH DIFFERENTIAL; Future  - HEP C VIRUS ANTIBODY; Future    2. Impaired fasting blood sugar  Fasting blood glucose was normal at 93, but his prior A1C in 9/2019 was still elevated at 5.8. He will continue to manage this through his own efforts. I advised him to make diet changes to improve his glucose levels. Advised him to avoid sweets, decrease his carbohydrate intake, exercise regularly and keep a healthy weight. Labs have been ordered for the next follow up visit including another A1C.   - Lipid Profile; Future  - Comp Metabolic Panel; Future  - HEMOGLOBIN A1C; Future  - CBC WITH DIFFERENTIAL; Future  - HEP C VIRUS ANTIBODY; Future    3. Need for hepatitis C screening test  He qualifies for a Hepatitis C screening based on the CDC guidelines. Screening lab work ordered. He agrees to proceed with the test.   - Lipid Profile; Future  - Comp Metabolic Panel; Future  - HEMOGLOBIN A1C; Future  - CBC WITH DIFFERENTIAL; Future  - HEP C VIRUS ANTIBODY; Future    4. Need for shingles vaccine  Shingrix vaccination given in the office today.  He will need a second dose 2 to 6 months from the first dose.  - Shingles Vaccine (Shingrix)    5. Need for 23-polyvalent pneumococcal polysaccharide vaccine  Pneumovax-23 given in the office today due to the patient's history of cigarette smoking.   - Pneumococal Polysaccharide Vaccine 23-Valent =>3YO SQ/IM       Darnell SMITH (Scribe), am scribing for, and in the presence of, Marcela Tomas MD     Electronically signed by: Darnell Szymanski (Elayne), 1/27/2020    Marcela SMITH MD personally performed the services described in this  documentation, as scribed by Darnell Szymanski in my presence, and it is both accurate and complete.

## 2020-01-27 NOTE — PATIENT INSTRUCTIONS
Notification received from pharmacy    ibandronate (BONIVA) 150 MG not covered by insurance    Alternative: alendronate or PA?   Patient instructions given regarding labs, x-rays, medications, referral and followup appointment.

## 2020-07-07 ENCOUNTER — TELEPHONE (OUTPATIENT)
Dept: MEDICAL GROUP | Facility: PHYSICIAN GROUP | Age: 60
End: 2020-07-07

## 2020-07-17 ENCOUNTER — HOSPITAL ENCOUNTER (OUTPATIENT)
Dept: LAB | Facility: MEDICAL CENTER | Age: 60
End: 2020-07-17
Attending: FAMILY MEDICINE
Payer: COMMERCIAL

## 2020-07-17 DIAGNOSIS — Z11.59 NEED FOR HEPATITIS C SCREENING TEST: ICD-10-CM

## 2020-07-17 DIAGNOSIS — E78.5 DYSLIPIDEMIA: ICD-10-CM

## 2020-07-17 DIAGNOSIS — R73.01 IMPAIRED FASTING BLOOD SUGAR: ICD-10-CM

## 2020-07-17 LAB
ALBUMIN SERPL BCP-MCNC: 4.8 G/DL (ref 3.2–4.9)
ALBUMIN/GLOB SERPL: 2 G/DL
ALP SERPL-CCNC: 59 U/L (ref 30–99)
ALT SERPL-CCNC: 22 U/L (ref 2–50)
ANION GAP SERPL CALC-SCNC: 12 MMOL/L (ref 7–16)
AST SERPL-CCNC: 21 U/L (ref 12–45)
BASOPHILS # BLD AUTO: 1.1 % (ref 0–1.8)
BASOPHILS # BLD: 0.07 K/UL (ref 0–0.12)
BILIRUB SERPL-MCNC: 0.6 MG/DL (ref 0.1–1.5)
BUN SERPL-MCNC: 16 MG/DL (ref 8–22)
CALCIUM SERPL-MCNC: 9.4 MG/DL (ref 8.5–10.5)
CHLORIDE SERPL-SCNC: 104 MMOL/L (ref 96–112)
CHOLEST SERPL-MCNC: 158 MG/DL (ref 100–199)
CO2 SERPL-SCNC: 26 MMOL/L (ref 20–33)
CREAT SERPL-MCNC: 0.81 MG/DL (ref 0.5–1.4)
EOSINOPHIL # BLD AUTO: 0.29 K/UL (ref 0–0.51)
EOSINOPHIL NFR BLD: 4.6 % (ref 0–6.9)
ERYTHROCYTE [DISTWIDTH] IN BLOOD BY AUTOMATED COUNT: 43.9 FL (ref 35.9–50)
EST. AVERAGE GLUCOSE BLD GHB EST-MCNC: 126 MG/DL
FASTING STATUS PATIENT QL REPORTED: NORMAL
GLOBULIN SER CALC-MCNC: 2.4 G/DL (ref 1.9–3.5)
GLUCOSE SERPL-MCNC: 107 MG/DL (ref 65–99)
HBA1C MFR BLD: 6 % (ref 0–5.6)
HCT VFR BLD AUTO: 46 % (ref 42–52)
HCV AB SER QL: NORMAL
HDLC SERPL-MCNC: 72 MG/DL
HGB BLD-MCNC: 15 G/DL (ref 14–18)
IMM GRANULOCYTES # BLD AUTO: 0.01 K/UL (ref 0–0.11)
IMM GRANULOCYTES NFR BLD AUTO: 0.2 % (ref 0–0.9)
LDLC SERPL CALC-MCNC: 73 MG/DL
LYMPHOCYTES # BLD AUTO: 1.91 K/UL (ref 1–4.8)
LYMPHOCYTES NFR BLD: 30.6 % (ref 22–41)
MCH RBC QN AUTO: 28.1 PG (ref 27–33)
MCHC RBC AUTO-ENTMCNC: 32.6 G/DL (ref 33.7–35.3)
MCV RBC AUTO: 86.1 FL (ref 81.4–97.8)
MONOCYTES # BLD AUTO: 0.6 K/UL (ref 0–0.85)
MONOCYTES NFR BLD AUTO: 9.6 % (ref 0–13.4)
NEUTROPHILS # BLD AUTO: 3.37 K/UL (ref 1.82–7.42)
NEUTROPHILS NFR BLD: 53.9 % (ref 44–72)
NRBC # BLD AUTO: 0 K/UL
NRBC BLD-RTO: 0 /100 WBC
PLATELET # BLD AUTO: 226 K/UL (ref 164–446)
PMV BLD AUTO: 8.6 FL (ref 9–12.9)
POTASSIUM SERPL-SCNC: 4.3 MMOL/L (ref 3.6–5.5)
PROT SERPL-MCNC: 7.2 G/DL (ref 6–8.2)
RBC # BLD AUTO: 5.34 M/UL (ref 4.7–6.1)
SODIUM SERPL-SCNC: 142 MMOL/L (ref 135–145)
TRIGL SERPL-MCNC: 63 MG/DL (ref 0–149)
WBC # BLD AUTO: 6.3 K/UL (ref 4.8–10.8)

## 2020-07-17 PROCEDURE — 36415 COLL VENOUS BLD VENIPUNCTURE: CPT

## 2020-07-17 PROCEDURE — 83036 HEMOGLOBIN GLYCOSYLATED A1C: CPT

## 2020-07-17 PROCEDURE — 80053 COMPREHEN METABOLIC PANEL: CPT

## 2020-07-17 PROCEDURE — 85025 COMPLETE CBC W/AUTO DIFF WBC: CPT

## 2020-07-17 PROCEDURE — 86803 HEPATITIS C AB TEST: CPT

## 2020-07-17 PROCEDURE — 80061 LIPID PANEL: CPT

## 2020-08-03 ENCOUNTER — OFFICE VISIT (OUTPATIENT)
Dept: MEDICAL GROUP | Facility: PHYSICIAN GROUP | Age: 60
End: 2020-08-03
Payer: COMMERCIAL

## 2020-08-03 VITALS
WEIGHT: 153.44 LBS | HEIGHT: 69 IN | HEART RATE: 57 BPM | SYSTOLIC BLOOD PRESSURE: 144 MMHG | BODY MASS INDEX: 22.73 KG/M2 | OXYGEN SATURATION: 99 % | TEMPERATURE: 97.8 F | DIASTOLIC BLOOD PRESSURE: 70 MMHG

## 2020-08-03 DIAGNOSIS — Z12.5 SCREENING FOR PROSTATE CANCER: ICD-10-CM

## 2020-08-03 DIAGNOSIS — Z00.00 ROUTINE PHYSICAL EXAMINATION: ICD-10-CM

## 2020-08-03 DIAGNOSIS — R73.01 IMPAIRED FASTING GLUCOSE: ICD-10-CM

## 2020-08-03 DIAGNOSIS — E78.5 DYSLIPIDEMIA: ICD-10-CM

## 2020-08-03 PROCEDURE — 99396 PREV VISIT EST AGE 40-64: CPT | Performed by: FAMILY MEDICINE

## 2020-08-03 RX ORDER — ATORVASTATIN CALCIUM 20 MG/1
20 TABLET, FILM COATED ORAL EVERY EVENING
Qty: 90 TAB | Refills: 1 | Status: SHIPPED | OUTPATIENT
Start: 2020-08-03 | End: 2021-05-05

## 2020-08-03 ASSESSMENT — FIBROSIS 4 INDEX: FIB4 SCORE: 1.21

## 2020-08-03 NOTE — PROGRESS NOTES
Subjective:      Leander Saxena is a 60 y.o. male who presents with Annual Exam            HPI     Patient is here for routine physical exam up-to-date with colonoscopy.  He is complete with all his immunizations including the 2 doses of Shingrix.    He is also here for follow-up of his dyslipidemia.  He continues to take Lipitor every night regularly without side effects.    We follow him for impaired fasting blood sugar and he tries to manage this by watching his diet.    His blood pressure is slightly elevated today.  He has no prior history of hypertension.  He said he does not use a lot of salt in his food.    I reviewed the following    Past Medical History:   Diagnosis Date   • Dyslipidemia    • Impaired fasting blood sugar         Past Surgical History:   Procedure Laterality Date   • COLONOSCOPY WITH POLYP  6/19/13    asc colon polyp-adenomatous, mild diverticulosis asc colon       No Known Allergies    Current Outpatient Medications   Medication Sig Dispense Refill   • atorvastatin (LIPITOR) 20 MG Tab Take 1 Tab by mouth every evening. 90 Tab 1     No current facility-administered medications for this visit.         Family History   Problem Relation Age of Onset   • Diabetes Mother    • Hypertension Mother    • Hyperlipidemia Mother    • Heart Attack Father         age 60s   • Hypertension Father    • Hyperlipidemia Father    • Diabetes Sister    • Diabetes Brother    • Heart Attack Brother         age 60   • Diabetes Brother    • No Known Problems Sister        Social History     Socioeconomic History   • Marital status:      Spouse name: Not on file   • Number of children: 2   • Years of education: Not on file   • Highest education level: Not on file   Occupational History   • Occupation: retired     Employer: OTHER   Social Needs   • Financial resource strain: Not on file   • Food insecurity     Worry: Not on file     Inability: Not on file   • Transportation needs     Medical: Not on file      "Non-medical: Not on file   Tobacco Use   • Smoking status: Current Every Day Smoker     Packs/day: 0.30     Years: 30.00     Pack years: 9.00     Types: Cigarettes   • Smokeless tobacco: Never Used   • Tobacco comment: 7 cig/day   Substance and Sexual Activity   • Alcohol use: Yes     Alcohol/week: 1.2 oz     Types: 1 Shots of liquor, 1 Cans of beer per week     Comment: occasional   • Drug use: Yes     Types: Marijuana, Inhaled     Comment: used cocaine in the past, marijuana 2-3x/week   • Sexual activity: Yes     Partners: Female   Lifestyle   • Physical activity     Days per week: Not on file     Minutes per session: Not on file   • Stress: Not on file   Relationships   • Social connections     Talks on phone: Not on file     Gets together: Not on file     Attends Spiritism service: Not on file     Active member of club or organization: Not on file     Attends meetings of clubs or organizations: Not on file     Relationship status: Not on file   • Intimate partner violence     Fear of current or ex partner: Not on file     Emotionally abused: Not on file     Physically abused: Not on file     Forced sexual activity: Not on file   Other Topics Concern   • Not on file   Social History Narrative   • Not on file        ROS     Per HPI, the rest are negative.     Objective:     /70 (BP Location: Left arm, Patient Position: Sitting, BP Cuff Size: Adult)   Pulse (!) 57   Temp 36.6 °C (97.8 °F) (Temporal)   Ht 1.753 m (5' 9\")   Wt 69.6 kg (153 lb 7 oz)   SpO2 99%   BMI 22.66 kg/m²      Physical Exam  Vitals signs and nursing note reviewed.   Constitutional:       General: He is not in acute distress.     Appearance: He is well-developed. He is not diaphoretic.   HENT:      Head: Normocephalic and atraumatic.      Right Ear: External ear normal.      Left Ear: External ear normal.      Nose: Nose normal.      Mouth/Throat:      Pharynx: No oropharyngeal exudate.   Eyes:      General: No scleral icterus.        " Right eye: No discharge.         Left eye: No discharge.      Conjunctiva/sclera: Conjunctivae normal.      Pupils: Pupils are equal, round, and reactive to light.   Neck:      Musculoskeletal: Normal range of motion and neck supple.      Thyroid: No thyromegaly.      Vascular: No JVD.      Trachea: No tracheal deviation.   Cardiovascular:      Rate and Rhythm: Normal rate and regular rhythm.      Heart sounds: Normal heart sounds. No murmur. No friction rub. No gallop.    Pulmonary:      Effort: Pulmonary effort is normal. No respiratory distress.      Breath sounds: Normal breath sounds. No stridor. No wheezing or rales.   Chest:      Chest wall: No tenderness.   Abdominal:      General: Bowel sounds are normal. There is no distension.      Palpations: Abdomen is soft. There is no mass.      Tenderness: There is no abdominal tenderness. There is no guarding or rebound.      Hernia: No hernia is present. There is no hernia in the left inguinal area or right inguinal area.   Genitourinary:     Penis: Normal and circumcised.       Scrotum/Testes: Normal.         Right: Mass, tenderness or swelling not present.         Left: Mass, tenderness or swelling not present.      Epididymis:      Right: Normal.      Left: Normal.      Prostate: Normal. Not enlarged, not tender and no nodules present.      Rectum: Normal. Guaiac result negative. No mass, tenderness, anal fissure, external hemorrhoid or internal hemorrhoid. Normal anal tone.   Musculoskeletal: Normal range of motion.         General: No tenderness or deformity.   Lymphadenopathy:      Cervical: No cervical adenopathy.      Lower Body: No right inguinal adenopathy. No left inguinal adenopathy.   Skin:     General: Skin is warm and dry.      Coloration: Skin is not pale.      Findings: No erythema or rash.   Neurological:      Mental Status: He is alert and oriented to person, place, and time.      Cranial Nerves: No cranial nerve deficit.      Motor: No abnormal  muscle tone.      Coordination: Coordination normal.      Deep Tendon Reflexes: Reflexes are normal and symmetric. Reflexes normal.   Psychiatric:         Behavior: Behavior normal.         Thought Content: Thought content normal.         Judgment: Judgment normal.        Hospital Outpatient Visit on 07/17/2020   Component Date Value Ref Range Status   • Hepatitis C Antibody 07/17/2020 Non-Reactive  Non-Reactive Final    Comment: Antibodies to HCV were not detected.  The Roche anti-HCV is a diagnostic test for the qualitative determination of  antibodies to the hepatitis C virus in human serum and plasma. The results  should be used and interpreted only in the context of the overall clinical  picture. A negative test result does not exclude the possibility of exposure  to hepatitis C virus.  Note: Assay performance characteristics have not been established in  populations of immunocompromised or immunosuppressed patients.     • WBC 07/17/2020 6.3  4.8 - 10.8 K/uL Final   • RBC 07/17/2020 5.34  4.70 - 6.10 M/uL Final   • Hemoglobin 07/17/2020 15.0  14.0 - 18.0 g/dL Final   • Hematocrit 07/17/2020 46.0  42.0 - 52.0 % Final   • MCV 07/17/2020 86.1  81.4 - 97.8 fL Final   • MCH 07/17/2020 28.1  27.0 - 33.0 pg Final   • MCHC 07/17/2020 32.6* 33.7 - 35.3 g/dL Final   • RDW 07/17/2020 43.9  35.9 - 50.0 fL Final   • Platelet Count 07/17/2020 226  164 - 446 K/uL Final   • MPV 07/17/2020 8.6* 9.0 - 12.9 fL Final   • Neutrophils-Polys 07/17/2020 53.90  44.00 - 72.00 % Final   • Lymphocytes 07/17/2020 30.60  22.00 - 41.00 % Final   • Monocytes 07/17/2020 9.60  0.00 - 13.40 % Final   • Eosinophils 07/17/2020 4.60  0.00 - 6.90 % Final   • Basophils 07/17/2020 1.10  0.00 - 1.80 % Final   • Immature Granulocytes 07/17/2020 0.20  0.00 - 0.90 % Final   • Nucleated RBC 07/17/2020 0.00  /100 WBC Final   • Neutrophils (Absolute) 07/17/2020 3.37  1.82 - 7.42 K/uL Final    Includes immature neutrophils, if present.   • Lymphs (Absolute)  07/17/2020 1.91  1.00 - 4.80 K/uL Final   • Monos (Absolute) 07/17/2020 0.60  0.00 - 0.85 K/uL Final   • Eos (Absolute) 07/17/2020 0.29  0.00 - 0.51 K/uL Final   • Baso (Absolute) 07/17/2020 0.07  0.00 - 0.12 K/uL Final   • Immature Granulocytes (abs) 07/17/2020 0.01  0.00 - 0.11 K/uL Final   • NRBC (Absolute) 07/17/2020 0.00  K/uL Final   • Glycohemoglobin 07/17/2020 6.0* 0.0 - 5.6 % Final    Comment: Increased risk for diabetes:  5.7 -6.4%  Diabetes:  >6.4%  Glycemic control for adults with diabetes:  <7.0%  The above interpretations are per ADA guidelines.  Diagnosis  of diabetes mellitus on the basis of elevated Hemoglobin A1c  should be confirmed by repeating the Hb A1c test.     • Est Avg Glucose 07/17/2020 126  mg/dL Final    Comment: The eAG calculation is based on the A1c-Derived Daily Glucose  (ADAG) study.  See the ADA's website for additional information.     • Sodium 07/17/2020 142  135 - 145 mmol/L Final   • Potassium 07/17/2020 4.3  3.6 - 5.5 mmol/L Final   • Chloride 07/17/2020 104  96 - 112 mmol/L Final   • Co2 07/17/2020 26  20 - 33 mmol/L Final   • Anion Gap 07/17/2020 12.0  7.0 - 16.0 Final   • Glucose 07/17/2020 107* 65 - 99 mg/dL Final   • Bun 07/17/2020 16  8 - 22 mg/dL Final   • Creatinine 07/17/2020 0.81  0.50 - 1.40 mg/dL Final   • Calcium 07/17/2020 9.4  8.5 - 10.5 mg/dL Final   • AST(SGOT) 07/17/2020 21  12 - 45 U/L Final   • ALT(SGPT) 07/17/2020 22  2 - 50 U/L Final   • Alkaline Phosphatase 07/17/2020 59  30 - 99 U/L Final   • Total Bilirubin 07/17/2020 0.6  0.1 - 1.5 mg/dL Final   • Albumin 07/17/2020 4.8  3.2 - 4.9 g/dL Final   • Total Protein 07/17/2020 7.2  6.0 - 8.2 g/dL Final   • Globulin 07/17/2020 2.4  1.9 - 3.5 g/dL Final   • A-G Ratio 07/17/2020 2.0  g/dL Final   • Cholesterol,Tot 07/17/2020 158  100 - 199 mg/dL Final   • Triglycerides 07/17/2020 63  0 - 149 mg/dL Final   • HDL 07/17/2020 72  >=40 mg/dL Final   • LDL 07/17/2020 73  <100 mg/dL Final   • Fasting Status 07/17/2020  Fasting   Final   • GFR If  07/17/2020 >60  >60 mL/min/1.73 m 2 Final   • GFR If Non  07/17/2020 >60  >60 mL/min/1.73 m 2 Final                   Assessment/Plan:       1. Routine physical examination  Complete exam was done.  Up-to-date with colonoscopy and all the immunizations.  Slight elevation of the systolic blood pressure but no prior history of hypertension.  Advised to watch the salt intake, exercise regularly and keep a healthy weight.  He will contact me if blood pressure is persistently elevated 140/90 or higher.    2. Dyslipidemia  All at target on atorvastatin.  Continue medication.  - atorvastatin (LIPITOR) 20 MG Tab; Take 1 Tab by mouth every evening.  Dispense: 90 Tab; Refill: 1  - Lipid Profile; Future  - Comp Metabolic Panel; Future  - HEMOGLOBIN A1C; Future  - PROSTATE SPECIFIC AG SCREENING; Future    3. Impaired fasting blood sugar  Fasting blood sugar borderline at 107 and hemoglobin A1c also borderline 6.0 consistent with prediabetes.  Advised work harder on avoidance of sweets and decreasing the carbs to avoid becoming full-blown diabetic.  Advised regular exercises and keeping a healthy weight.  Recheck blood work in 6 months.  - Lipid Profile; Future  - Comp Metabolic Panel; Future  - HEMOGLOBIN A1C; Future  - PROSTATE SPECIFIC AG SCREENING; Future    4. Screening for prostate cancer  Screening PSA ordered for next visit because he will be due at that time.  - Lipid Profile; Future  - Comp Metabolic Panel; Future  - HEMOGLOBIN A1C; Future  - PROSTATE SPECIFIC AG SCREENING; Future    Follow-up in 6 months.      Please note that this dictation was created using voice recognition software. I have worked with consultants from the vendor as well as technical experts from [employer] to optimize the interface. I have made every reasonable attempt to correct obvious errors, but I expect that there are errors of grammar and possibly content I did not discover  before finalizing the note.

## 2020-08-04 ENCOUNTER — APPOINTMENT (OUTPATIENT)
Dept: MEDICAL GROUP | Facility: PHYSICIAN GROUP | Age: 60
End: 2020-08-04
Payer: COMMERCIAL

## 2021-02-01 ENCOUNTER — HOSPITAL ENCOUNTER (OUTPATIENT)
Dept: LAB | Facility: MEDICAL CENTER | Age: 61
End: 2021-02-01
Attending: FAMILY MEDICINE
Payer: COMMERCIAL

## 2021-02-01 DIAGNOSIS — R73.01 IMPAIRED FASTING GLUCOSE: ICD-10-CM

## 2021-02-01 DIAGNOSIS — E78.5 DYSLIPIDEMIA: ICD-10-CM

## 2021-02-01 DIAGNOSIS — Z12.5 SCREENING FOR PROSTATE CANCER: ICD-10-CM

## 2021-02-01 LAB
ALBUMIN SERPL BCP-MCNC: 4.4 G/DL (ref 3.2–4.9)
ALBUMIN/GLOB SERPL: 1.7 G/DL
ALP SERPL-CCNC: 61 U/L (ref 30–99)
ALT SERPL-CCNC: 25 U/L (ref 2–50)
ANION GAP SERPL CALC-SCNC: 9 MMOL/L (ref 7–16)
AST SERPL-CCNC: 15 U/L (ref 12–45)
BILIRUB SERPL-MCNC: 0.5 MG/DL (ref 0.1–1.5)
BUN SERPL-MCNC: 16 MG/DL (ref 8–22)
CALCIUM SERPL-MCNC: 9.3 MG/DL (ref 8.5–10.5)
CHLORIDE SERPL-SCNC: 103 MMOL/L (ref 96–112)
CHOLEST SERPL-MCNC: 151 MG/DL (ref 100–199)
CO2 SERPL-SCNC: 26 MMOL/L (ref 20–33)
CREAT SERPL-MCNC: 0.78 MG/DL (ref 0.5–1.4)
EST. AVERAGE GLUCOSE BLD GHB EST-MCNC: 134 MG/DL
FASTING STATUS PATIENT QL REPORTED: NORMAL
GLOBULIN SER CALC-MCNC: 2.6 G/DL (ref 1.9–3.5)
GLUCOSE SERPL-MCNC: 112 MG/DL (ref 65–99)
HBA1C MFR BLD: 6.3 % (ref 0–5.6)
HDLC SERPL-MCNC: 50 MG/DL
LDLC SERPL CALC-MCNC: 86 MG/DL
POTASSIUM SERPL-SCNC: 4.2 MMOL/L (ref 3.6–5.5)
PROT SERPL-MCNC: 7 G/DL (ref 6–8.2)
SODIUM SERPL-SCNC: 138 MMOL/L (ref 135–145)
TRIGL SERPL-MCNC: 75 MG/DL (ref 0–149)

## 2021-02-01 PROCEDURE — 84153 ASSAY OF PSA TOTAL: CPT

## 2021-02-01 PROCEDURE — 36415 COLL VENOUS BLD VENIPUNCTURE: CPT

## 2021-02-01 PROCEDURE — 80061 LIPID PANEL: CPT

## 2021-02-01 PROCEDURE — 83036 HEMOGLOBIN GLYCOSYLATED A1C: CPT

## 2021-02-01 PROCEDURE — 80053 COMPREHEN METABOLIC PANEL: CPT

## 2021-02-02 LAB — PSA SERPL-MCNC: 1.45 NG/ML (ref 0–4)

## 2021-02-04 ENCOUNTER — OFFICE VISIT (OUTPATIENT)
Dept: MEDICAL GROUP | Facility: PHYSICIAN GROUP | Age: 61
End: 2021-02-04
Payer: COMMERCIAL

## 2021-02-04 VITALS
WEIGHT: 159.83 LBS | DIASTOLIC BLOOD PRESSURE: 60 MMHG | BODY MASS INDEX: 23.67 KG/M2 | HEIGHT: 69 IN | TEMPERATURE: 97.4 F | HEART RATE: 68 BPM | SYSTOLIC BLOOD PRESSURE: 130 MMHG | OXYGEN SATURATION: 97 %

## 2021-02-04 DIAGNOSIS — E78.5 DYSLIPIDEMIA: ICD-10-CM

## 2021-02-04 DIAGNOSIS — R73.01 IMPAIRED FASTING GLUCOSE: ICD-10-CM

## 2021-02-04 DIAGNOSIS — N52.9 ERECTILE DYSFUNCTION, UNSPECIFIED ERECTILE DYSFUNCTION TYPE: ICD-10-CM

## 2021-02-04 PROCEDURE — 99214 OFFICE O/P EST MOD 30 MIN: CPT | Performed by: FAMILY MEDICINE

## 2021-02-04 RX ORDER — SILDENAFIL CITRATE 20 MG/1
TABLET ORAL
Qty: 100 TAB | Refills: 1 | Status: ON HOLD | OUTPATIENT
Start: 2021-02-04 | End: 2021-09-03

## 2021-02-04 ASSESSMENT — FIBROSIS 4 INDEX: FIB4 SCORE: 0.81

## 2021-02-04 ASSESSMENT — PATIENT HEALTH QUESTIONNAIRE - PHQ9: CLINICAL INTERPRETATION OF PHQ2 SCORE: 0

## 2021-02-05 NOTE — PROGRESS NOTES
"Subjective:      Leander Saxena is a 60 y.o. male who presents with Hyperlipidemia            HPI     Patient is here for follow-up of his medical problems.    In terms of the dyslipidemia, he continues to take atorvastatin without myalgias.    We follow him for impaired fasting blood sugar and he manages this by watching his diet.    He said he has been having problems with erectile dysfunction in the last 1 month.  He said his erection is not lasting as long as it used to be.  He would like to try Viagra.  He has not tried ED medications before.    Past medical history, past surgical history, family history reviewed-no changes    Social history reviewed-no changes    Allergies reviewed-no changes    Medications reviewed-no changes    ROS     As per HPI, the rest are negative.       Objective:     /60 (BP Location: Left arm, Patient Position: Sitting, BP Cuff Size: Adult)   Pulse 68   Temp 36.3 °C (97.4 °F) (Temporal)   Ht 1.753 m (5' 9\")   Wt 72.5 kg (159 lb 13.3 oz)   SpO2 97%   BMI 23.60 kg/m²      Physical Exam     Examined alert, awake, oriented, not in distress    Neck-supple, no lymphadenopathy, no thyromegaly  Lungs-clear to auscultation, no rales, no wheezes  Heart-regular rate and rhythm, no murmur  Extremities-no edema, clubbing, cyanosis       Hospital Outpatient Visit on 02/01/2021   Component Date Value Ref Range Status   • Prostatic Specific Antigen Tot 02/01/2021 1.45  0.00 - 4.00 ng/mL Final    Comment: Effective 3/18/2020, this assay is performed using Roche nancy e immunoassay  analyzer. tPSA values determined on patient samples by different testing  procedures cannot be directly compared with one another and could be the  cause of erroneous medical interpretations. If there is a change in the tPSA  assay procedure used while monitoring therapy, then new baselines may need  to be established when comparing previous results with results received  after 3/17/2020.     • " Glycohemoglobin 02/01/2021 6.3* 0.0 - 5.6 % Final    Comment: Increased risk for diabetes:  5.7 -6.4%  Diabetes:  >6.4%  Glycemic control for adults with diabetes:  <7.0%  The above interpretations are per ADA guidelines.  Diagnosis  of diabetes mellitus on the basis of elevated Hemoglobin A1c  should be confirmed by repeating the Hb A1c test.     • Est Avg Glucose 02/01/2021 134  mg/dL Final    Comment: The eAG calculation is based on the A1c-Derived Daily Glucose  (ADAG) study.  See the ADA's website for additional information.     • Sodium 02/01/2021 138  135 - 145 mmol/L Final   • Potassium 02/01/2021 4.2  3.6 - 5.5 mmol/L Final   • Chloride 02/01/2021 103  96 - 112 mmol/L Final   • Co2 02/01/2021 26  20 - 33 mmol/L Final   • Anion Gap 02/01/2021 9.0  7.0 - 16.0 Final   • Glucose 02/01/2021 112* 65 - 99 mg/dL Final   • Bun 02/01/2021 16  8 - 22 mg/dL Final   • Creatinine 02/01/2021 0.78  0.50 - 1.40 mg/dL Final   • Calcium 02/01/2021 9.3  8.5 - 10.5 mg/dL Final   • AST(SGOT) 02/01/2021 15  12 - 45 U/L Final   • ALT(SGPT) 02/01/2021 25  2 - 50 U/L Final   • Alkaline Phosphatase 02/01/2021 61  30 - 99 U/L Final   • Total Bilirubin 02/01/2021 0.5  0.1 - 1.5 mg/dL Final   • Albumin 02/01/2021 4.4  3.2 - 4.9 g/dL Final   • Total Protein 02/01/2021 7.0  6.0 - 8.2 g/dL Final   • Globulin 02/01/2021 2.6  1.9 - 3.5 g/dL Final   • A-G Ratio 02/01/2021 1.7  g/dL Final   • Cholesterol,Tot 02/01/2021 151  100 - 199 mg/dL Final   • Triglycerides 02/01/2021 75  0 - 149 mg/dL Final   • HDL 02/01/2021 50  >=40 mg/dL Final   • LDL 02/01/2021 86  <100 mg/dL Final   • Fasting Status 02/01/2021 Fasting   Final   • GFR If  02/01/2021 >60  >60 mL/min/1.73 m 2 Final   • GFR If Non  02/01/2021 >60  >60 mL/min/1.73 m 2 Final          Assessment/Plan:        1. Dyslipidemia  All at target on atorvastatin.  Continue medication.  - Lipid Profile; Future  - Comp Metabolic Panel; Future  - HEMOGLOBIN A1C;  Future  - CBC WITH DIFFERENTIAL; Future    2. Impaired fasting blood sugar  A single sugar is still borderline elevated at 112.  Hemoglobin A1c is also elevated at 6.3 which is prediabetic level.  Advised to continue to avoid sweets and decrease the carbs to avoid full blown diabetes.  He admits that he has been eating ice cream every night in the last few weeks.  Advised to cut back on ice cream probably every other night.  Recheck blood work next visit.  - Lipid Profile; Future  - Comp Metabolic Panel; Future  - HEMOGLOBIN A1C; Future  - CBC WITH DIFFERENTIAL; Future    3. Erectile dysfunction, unspecified erectile dysfunction type  We discussed mechanism of action of the medication and potential side effects.  We also discussed interaction with nitrates.  Made him aware that insurance will not cover the medication.  I will have him get generic equivalent sildenafil 20 mg and he can take 2 1/2 tablets total of 50 mg and if this does not work he can take 5 tablets total of 100 mg 1 hour before sex.  Made aware that the medication effect lasts for hours only.  He can only have 1 dose in 24 hours.  He will let me know how he responds to the patient.  - sildenafil (REVATIO) 20 MG tablet; Take 5 tabs by mouth as needed 1 hour before sexual activity.  Dispense: 100 Tab; Refill: 1    Follow-up in 6 months or sooner if needed.    Please note that this dictation was created using voice recognition software. I have worked with consultants from the vendor as well as technical experts from [facility]  [employer] to optimize the interface. I have made every reasonable attempt to correct obvious errors, but I expect that there are errors of grammar and possibly content I did not discover before finalizing the note.

## 2021-03-15 DIAGNOSIS — Z23 NEED FOR VACCINATION: ICD-10-CM

## 2021-03-23 ENCOUNTER — IMMUNIZATION (OUTPATIENT)
Dept: FAMILY PLANNING/WOMEN'S HEALTH CLINIC | Facility: IMMUNIZATION CENTER | Age: 61
End: 2021-03-23
Attending: INTERNAL MEDICINE
Payer: COMMERCIAL

## 2021-03-23 DIAGNOSIS — Z23 NEED FOR VACCINATION: ICD-10-CM

## 2021-03-23 DIAGNOSIS — Z23 ENCOUNTER FOR VACCINATION: Primary | ICD-10-CM

## 2021-03-23 PROCEDURE — 0001A PFIZER SARS-COV-2 VACCINE: CPT

## 2021-03-23 PROCEDURE — 91300 PFIZER SARS-COV-2 VACCINE: CPT

## 2021-04-15 ENCOUNTER — IMMUNIZATION (OUTPATIENT)
Dept: FAMILY PLANNING/WOMEN'S HEALTH CLINIC | Facility: IMMUNIZATION CENTER | Age: 61
End: 2021-04-15
Attending: INTERNAL MEDICINE
Payer: COMMERCIAL

## 2021-04-15 DIAGNOSIS — Z23 ENCOUNTER FOR VACCINATION: Primary | ICD-10-CM

## 2021-04-15 PROCEDURE — 0002A PFIZER SARS-COV-2 VACCINE: CPT | Performed by: INTERNAL MEDICINE

## 2021-04-15 PROCEDURE — 91300 PFIZER SARS-COV-2 VACCINE: CPT | Performed by: INTERNAL MEDICINE

## 2021-04-26 ENCOUNTER — OFFICE VISIT (OUTPATIENT)
Dept: MEDICAL GROUP | Facility: PHYSICIAN GROUP | Age: 61
End: 2021-04-26
Payer: COMMERCIAL

## 2021-04-26 VITALS
BODY MASS INDEX: 23.38 KG/M2 | HEART RATE: 65 BPM | HEIGHT: 69 IN | WEIGHT: 157.85 LBS | DIASTOLIC BLOOD PRESSURE: 60 MMHG | TEMPERATURE: 98.5 F | SYSTOLIC BLOOD PRESSURE: 120 MMHG | OXYGEN SATURATION: 97 %

## 2021-04-26 DIAGNOSIS — R10.9 RIGHT FLANK PAIN: ICD-10-CM

## 2021-04-26 DIAGNOSIS — R31.0 GROSS HEMATURIA: ICD-10-CM

## 2021-04-26 LAB
APPEARANCE UR: NORMAL
BILIRUB UR STRIP-MCNC: NORMAL MG/DL
COLOR UR AUTO: NORMAL
GLUCOSE UR STRIP.AUTO-MCNC: NORMAL MG/DL
KETONES UR STRIP.AUTO-MCNC: NORMAL MG/DL
LEUKOCYTE ESTERASE UR QL STRIP.AUTO: NORMAL
NITRITE UR QL STRIP.AUTO: NORMAL
PH UR STRIP.AUTO: 5.5 [PH] (ref 5–8)
PROT UR QL STRIP: NORMAL MG/DL
RBC UR QL AUTO: NORMAL
SP GR UR STRIP.AUTO: 1.01
UROBILINOGEN UR STRIP-MCNC: 0.2 MG/DL

## 2021-04-26 PROCEDURE — 99213 OFFICE O/P EST LOW 20 MIN: CPT | Performed by: FAMILY MEDICINE

## 2021-04-26 PROCEDURE — 81002 URINALYSIS NONAUTO W/O SCOPE: CPT | Performed by: FAMILY MEDICINE

## 2021-04-26 ASSESSMENT — FIBROSIS 4 INDEX: FIB4 SCORE: 0.82

## 2021-04-26 NOTE — PROGRESS NOTES
"Subjective:      Leander Saxena is a 61 y.o. male who presents with Pain (right flank)            HPI   Patient is here complaining of right-sided flank pain for about a week now on and off.  The pain radiates to the right upper quadrant as well as the right groin described as pulling sensation in the groin.  He said the pain is 3 out of 10.  A week ago he passed blood during urination with small amount of blood clot.  This only happened 1 time.  No history of kidney stone.  No trauma to the right flank.  Denies any fever, chills, burning on urination, frequency, urgency, disturbances in the flow of the urine.    Past medical history, past surgical history, family history reviewed-no changes    Social history reviewed-no changes    Allergies reviewed-no changes    Medications reviewed-no changes    ROS   As per HPI, the rest are negative.         Objective:     /60 (BP Location: Left arm, Patient Position: Sitting, BP Cuff Size: Adult)   Pulse 65   Temp 36.9 °C (98.5 °F) (Temporal)   Ht 1.753 m (5' 9\")   Wt 71.6 kg (157 lb 13.6 oz)   SpO2 97%   BMI 23.31 kg/m²      Physical Exam examined alert, awake, oriented, not in distress    Neck-supple, no lymphadenopathy, no thyromegaly  Lungs-clear to auscultation, no rales, no wheezes  Heart-regular rate and rhythm, no murmur  Abdomen-good bowel sounds, soft, slight tenderness right lower quadrant and suprapubic area, no hepatosplenomegaly, no masses, no CVA tenderness  Extremities-no edema, clubbing, cyanosis     Urine dipstick    Within normal limits, negative for blood            Assessment/Plan:        1. Right flank pain  Differential diagnosis include kidney stone, bladder tumors, kidney tumors.  We will proceed with CT renal colic protocol to further evaluate the right flank pain and gross hematuria.  Further recommendation will depend on results.  Advised to hydrate properly.  - POCT Urinalysis    2. Gross hematuria  Same as #1.  - POCT " Urinalysis    Keep appointment scheduled in August 2021.  Return sooner if there is worsening of problem.      Please note that this dictation was created using voice recognition software. I have worked with consultants from the vendor as well as technical experts from [facility] to optimize the interface. I have made every reasonable attempt to correct obvious errors, but I expect that there are errors of grammar and possibly content I did not discover before finalizing the note.

## 2021-04-28 ENCOUNTER — HOSPITAL ENCOUNTER (OUTPATIENT)
Dept: RADIOLOGY | Facility: MEDICAL CENTER | Age: 61
End: 2021-04-28
Attending: FAMILY MEDICINE
Payer: COMMERCIAL

## 2021-04-28 DIAGNOSIS — R31.0 GROSS HEMATURIA: ICD-10-CM

## 2021-04-28 DIAGNOSIS — R10.9 RIGHT FLANK PAIN: ICD-10-CM

## 2021-04-28 PROCEDURE — 74176 CT ABD & PELVIS W/O CONTRAST: CPT

## 2021-04-29 DIAGNOSIS — R31.0 HEMATURIA, GROSS: ICD-10-CM

## 2021-04-29 DIAGNOSIS — R10.9 RIGHT FLANK PAIN: ICD-10-CM

## 2021-04-29 DIAGNOSIS — R93.5 ABNORMAL CT OF THE ABDOMEN: ICD-10-CM

## 2021-04-30 DIAGNOSIS — N41.0 ACUTE PROSTATITIS WITH HEMATURIA: ICD-10-CM

## 2021-04-30 RX ORDER — SULFAMETHOXAZOLE AND TRIMETHOPRIM 800; 160 MG/1; MG/1
1 TABLET ORAL 2 TIMES DAILY
Qty: 20 TABLET | Refills: 0 | Status: SHIPPED | OUTPATIENT
Start: 2021-04-30 | End: 2021-05-29

## 2021-05-05 DIAGNOSIS — E78.5 DYSLIPIDEMIA: ICD-10-CM

## 2021-05-05 RX ORDER — ATORVASTATIN CALCIUM 20 MG/1
TABLET, FILM COATED ORAL
Qty: 90 TABLET | Refills: 1 | Status: SHIPPED | OUTPATIENT
Start: 2021-05-05 | End: 2021-11-08

## 2021-05-29 ENCOUNTER — HOSPITAL ENCOUNTER (OUTPATIENT)
Facility: MEDICAL CENTER | Age: 61
End: 2021-05-29
Attending: NURSE PRACTITIONER
Payer: COMMERCIAL

## 2021-05-29 ENCOUNTER — OFFICE VISIT (OUTPATIENT)
Dept: URGENT CARE | Facility: PHYSICIAN GROUP | Age: 61
End: 2021-05-29
Payer: COMMERCIAL

## 2021-05-29 VITALS
OXYGEN SATURATION: 99 % | WEIGHT: 155 LBS | TEMPERATURE: 97.5 F | RESPIRATION RATE: 18 BRPM | SYSTOLIC BLOOD PRESSURE: 144 MMHG | HEIGHT: 70 IN | BODY MASS INDEX: 22.19 KG/M2 | HEART RATE: 56 BPM | DIASTOLIC BLOOD PRESSURE: 84 MMHG

## 2021-05-29 DIAGNOSIS — N30.01 ACUTE CYSTITIS WITH HEMATURIA: ICD-10-CM

## 2021-05-29 DIAGNOSIS — R68.89 FLU-LIKE SYMPTOMS: ICD-10-CM

## 2021-05-29 DIAGNOSIS — R11.0 NAUSEA: ICD-10-CM

## 2021-05-29 DIAGNOSIS — Z20.822 SUSPECTED COVID-19 VIRUS INFECTION: ICD-10-CM

## 2021-05-29 LAB
APPEARANCE UR: CLEAR
BILIRUB UR STRIP-MCNC: NEGATIVE MG/DL
COLOR UR AUTO: YELLOW
COVID ORDER STATUS COVID19: NORMAL
GLUCOSE UR STRIP.AUTO-MCNC: NEGATIVE MG/DL
KETONES UR STRIP.AUTO-MCNC: NEGATIVE MG/DL
LEUKOCYTE ESTERASE UR QL STRIP.AUTO: NEGATIVE
NITRITE UR QL STRIP.AUTO: NEGATIVE
PH UR STRIP.AUTO: 7.5 [PH] (ref 5–8)
PROT UR QL STRIP: NEGATIVE MG/DL
RBC UR QL AUTO: NORMAL
SP GR UR STRIP.AUTO: 1.02
UROBILINOGEN UR STRIP-MCNC: 0.2 MG/DL

## 2021-05-29 PROCEDURE — U0005 INFEC AGEN DETEC AMPLI PROBE: HCPCS

## 2021-05-29 PROCEDURE — 81002 URINALYSIS NONAUTO W/O SCOPE: CPT | Performed by: NURSE PRACTITIONER

## 2021-05-29 PROCEDURE — 99214 OFFICE O/P EST MOD 30 MIN: CPT | Mod: CS | Performed by: NURSE PRACTITIONER

## 2021-05-29 PROCEDURE — 87086 URINE CULTURE/COLONY COUNT: CPT

## 2021-05-29 PROCEDURE — U0003 INFECTIOUS AGENT DETECTION BY NUCLEIC ACID (DNA OR RNA); SEVERE ACUTE RESPIRATORY SYNDROME CORONAVIRUS 2 (SARS-COV-2) (CORONAVIRUS DISEASE [COVID-19]), AMPLIFIED PROBE TECHNIQUE, MAKING USE OF HIGH THROUGHPUT TECHNOLOGIES AS DESCRIBED BY CMS-2020-01-R: HCPCS

## 2021-05-29 RX ORDER — CIPROFLOXACIN 500 MG/1
500 TABLET, FILM COATED ORAL EVERY 12 HOURS
Qty: 14 TABLET | Refills: 0 | Status: SHIPPED | OUTPATIENT
Start: 2021-05-29 | End: 2021-06-05

## 2021-05-29 RX ORDER — ONDANSETRON 4 MG/1
4 TABLET, ORALLY DISINTEGRATING ORAL EVERY 6 HOURS PRN
Qty: 10 TABLET | Refills: 0 | Status: SHIPPED | OUTPATIENT
Start: 2021-05-29 | End: 2021-06-03

## 2021-05-29 RX ORDER — ONDANSETRON 4 MG/1
4 TABLET, ORALLY DISINTEGRATING ORAL ONCE
Status: COMPLETED | OUTPATIENT
Start: 2021-05-29 | End: 2021-05-29

## 2021-05-29 RX ORDER — SULFAMETHOXAZOLE AND TRIMETHOPRIM 800; 160 MG/1; MG/1
1 TABLET ORAL EVERY 12 HOURS
Qty: 28 TABLET | Refills: 0 | Status: SHIPPED | OUTPATIENT
Start: 2021-05-29 | End: 2021-05-29

## 2021-05-29 RX ADMIN — ONDANSETRON 4 MG: 4 TABLET, ORALLY DISINTEGRATING ORAL at 17:03

## 2021-05-29 ASSESSMENT — ENCOUNTER SYMPTOMS
HEADACHES: 1
NAUSEA: 1
PALPITATIONS: 0
CLAUDICATION: 0
DIZZINESS: 1
ABDOMINAL PAIN: 0
PND: 0
BLURRED VISION: 0
FEVER: 0
PHOTOPHOBIA: 0
MYALGIAS: 1
SINUS PRESSURE: 1
ORTHOPNEA: 0
COUGH: 0
CHILLS: 1
DOUBLE VISION: 0
DIARRHEA: 0
VOMITING: 0

## 2021-05-29 ASSESSMENT — FIBROSIS 4 INDEX: FIB4 SCORE: 0.82

## 2021-05-30 LAB
SARS-COV-2 RNA RESP QL NAA+PROBE: NOTDETECTED
SPECIMEN SOURCE: NORMAL

## 2021-06-01 LAB
BACTERIA UR CULT: NORMAL
SIGNIFICANT IND 70042: NORMAL
SITE SITE: NORMAL
SOURCE SOURCE: NORMAL

## 2021-07-26 ENCOUNTER — HOSPITAL ENCOUNTER (OUTPATIENT)
Dept: LAB | Facility: MEDICAL CENTER | Age: 61
End: 2021-07-26
Attending: FAMILY MEDICINE
Payer: COMMERCIAL

## 2021-07-26 DIAGNOSIS — R73.01 IMPAIRED FASTING GLUCOSE: ICD-10-CM

## 2021-07-26 DIAGNOSIS — E78.5 DYSLIPIDEMIA: ICD-10-CM

## 2021-07-26 LAB
ALBUMIN SERPL BCP-MCNC: 4.6 G/DL (ref 3.2–4.9)
ALBUMIN/GLOB SERPL: 1.8 G/DL
ALP SERPL-CCNC: 62 U/L (ref 30–99)
ALT SERPL-CCNC: 24 U/L (ref 2–50)
ANION GAP SERPL CALC-SCNC: 13 MMOL/L (ref 7–16)
AST SERPL-CCNC: 26 U/L (ref 12–45)
BASOPHILS # BLD AUTO: 1.2 % (ref 0–1.8)
BASOPHILS # BLD: 0.07 K/UL (ref 0–0.12)
BILIRUB SERPL-MCNC: 0.6 MG/DL (ref 0.1–1.5)
BUN SERPL-MCNC: 16 MG/DL (ref 8–22)
CALCIUM SERPL-MCNC: 9.3 MG/DL (ref 8.5–10.5)
CHLORIDE SERPL-SCNC: 105 MMOL/L (ref 96–112)
CHOLEST SERPL-MCNC: 167 MG/DL (ref 100–199)
CO2 SERPL-SCNC: 23 MMOL/L (ref 20–33)
CREAT SERPL-MCNC: 0.77 MG/DL (ref 0.5–1.4)
EOSINOPHIL # BLD AUTO: 0.32 K/UL (ref 0–0.51)
EOSINOPHIL NFR BLD: 5.5 % (ref 0–6.9)
ERYTHROCYTE [DISTWIDTH] IN BLOOD BY AUTOMATED COUNT: 43.8 FL (ref 35.9–50)
EST. AVERAGE GLUCOSE BLD GHB EST-MCNC: 117 MG/DL
FASTING STATUS PATIENT QL REPORTED: NORMAL
GLOBULIN SER CALC-MCNC: 2.6 G/DL (ref 1.9–3.5)
GLUCOSE SERPL-MCNC: 120 MG/DL (ref 65–99)
HBA1C MFR BLD: 5.7 % (ref 4–5.6)
HCT VFR BLD AUTO: 46.6 % (ref 42–52)
HDLC SERPL-MCNC: 62 MG/DL
HGB BLD-MCNC: 15.1 G/DL (ref 14–18)
IMM GRANULOCYTES # BLD AUTO: 0.01 K/UL (ref 0–0.11)
IMM GRANULOCYTES NFR BLD AUTO: 0.2 % (ref 0–0.9)
LDLC SERPL CALC-MCNC: 92 MG/DL
LYMPHOCYTES # BLD AUTO: 2.12 K/UL (ref 1–4.8)
LYMPHOCYTES NFR BLD: 36.2 % (ref 22–41)
MCH RBC QN AUTO: 27.9 PG (ref 27–33)
MCHC RBC AUTO-ENTMCNC: 32.4 G/DL (ref 33.7–35.3)
MCV RBC AUTO: 86 FL (ref 81.4–97.8)
MONOCYTES # BLD AUTO: 0.56 K/UL (ref 0–0.85)
MONOCYTES NFR BLD AUTO: 9.6 % (ref 0–13.4)
NEUTROPHILS # BLD AUTO: 2.78 K/UL (ref 1.82–7.42)
NEUTROPHILS NFR BLD: 47.3 % (ref 44–72)
NRBC # BLD AUTO: 0 K/UL
NRBC BLD-RTO: 0 /100 WBC
PLATELET # BLD AUTO: 219 K/UL (ref 164–446)
PMV BLD AUTO: 8.7 FL (ref 9–12.9)
POTASSIUM SERPL-SCNC: 4.1 MMOL/L (ref 3.6–5.5)
PROT SERPL-MCNC: 7.2 G/DL (ref 6–8.2)
RBC # BLD AUTO: 5.42 M/UL (ref 4.7–6.1)
SODIUM SERPL-SCNC: 141 MMOL/L (ref 135–145)
TRIGL SERPL-MCNC: 64 MG/DL (ref 0–149)
WBC # BLD AUTO: 5.9 K/UL (ref 4.8–10.8)

## 2021-07-26 PROCEDURE — 83036 HEMOGLOBIN GLYCOSYLATED A1C: CPT

## 2021-07-26 PROCEDURE — 80053 COMPREHEN METABOLIC PANEL: CPT

## 2021-07-26 PROCEDURE — 80061 LIPID PANEL: CPT

## 2021-07-26 PROCEDURE — 85025 COMPLETE CBC W/AUTO DIFF WBC: CPT

## 2021-07-26 PROCEDURE — 36415 COLL VENOUS BLD VENIPUNCTURE: CPT

## 2021-08-03 ENCOUNTER — HOSPITAL ENCOUNTER (OUTPATIENT)
Dept: LAB | Facility: MEDICAL CENTER | Age: 61
End: 2021-08-03
Attending: UROLOGY
Payer: COMMERCIAL

## 2021-08-03 PROCEDURE — 87086 URINE CULTURE/COLONY COUNT: CPT

## 2021-08-04 ENCOUNTER — OFFICE VISIT (OUTPATIENT)
Dept: MEDICAL GROUP | Facility: PHYSICIAN GROUP | Age: 61
End: 2021-08-04
Payer: COMMERCIAL

## 2021-08-04 VITALS
SYSTOLIC BLOOD PRESSURE: 136 MMHG | TEMPERATURE: 98.5 F | DIASTOLIC BLOOD PRESSURE: 80 MMHG | BODY MASS INDEX: 22.28 KG/M2 | WEIGHT: 155.65 LBS | HEART RATE: 57 BPM | OXYGEN SATURATION: 98 % | HEIGHT: 70 IN

## 2021-08-04 DIAGNOSIS — B35.9 TINEA: ICD-10-CM

## 2021-08-04 DIAGNOSIS — Z12.5 SCREENING FOR PROSTATE CANCER: ICD-10-CM

## 2021-08-04 DIAGNOSIS — R20.2 RIGHT HAND PARESTHESIA: ICD-10-CM

## 2021-08-04 DIAGNOSIS — R73.01 IMPAIRED FASTING GLUCOSE: ICD-10-CM

## 2021-08-04 DIAGNOSIS — E78.5 DYSLIPIDEMIA: ICD-10-CM

## 2021-08-04 DIAGNOSIS — N32.89 MASS OF URINARY BLADDER: ICD-10-CM

## 2021-08-04 PROBLEM — R39.198 DECREASED URINE STREAM: Status: ACTIVE | Noted: 2021-08-04

## 2021-08-04 PROCEDURE — 99214 OFFICE O/P EST MOD 30 MIN: CPT | Performed by: FAMILY MEDICINE

## 2021-08-04 RX ORDER — PRENATAL VIT 91/IRON/FOLIC/DHA 28-975-200
COMBINATION PACKAGE (EA) ORAL
Qty: 15 G | Refills: 1 | Status: SHIPPED | OUTPATIENT
Start: 2021-08-04 | End: 2022-09-01

## 2021-08-04 ASSESSMENT — FIBROSIS 4 INDEX: FIB4 SCORE: 1.5

## 2021-08-04 NOTE — PROGRESS NOTES
"Subjective:      Leander Saxena is a 61 y.o. male who presents with Follow-Up            HPI patient returns for follow-up of his medical problems.     He was seen and evaluated by the urologist for gross hematuria and right flank pain.  He had 2 episodes of gross hematuria.  His CT scan renal colic evaluation came back urinary bladder wall thickening which could be related to bladder outlet narrowing but mucosal mass is not excluded.  He said he does not have any more in the right flank pain and the hematuria has not recurred but the urine stream has been diminished and slow and not as forceful.  He is scheduled to have transurethral bladder resection of the tumor on September 3.  Yesterday he was sent for urinalysis and culture.  He is still waiting for results.  He said has frequent urination without any pain but the stream is slow.    For the dyslipidemia, he continues to take atorvastatin without myalgias.    We follow him for impaired fasting blood sugar and he manages this by watching his diet.    He has been having tingling of the fingertips of the right hand.  This happens on and off all day and also at night.  This has been ongoing for about a month.    He also has a hypopigmented round area on the left side of the neck that has been there for a month.  He is wondering if this could be ringworm.    Past medical history, past surgical history, family history reviewed-no changes    Social history reviewed-no changes    Allergies reviewed-no changes    Medications reviewed-no changes    ROS   As per HPI, the rest are negative.         Objective:     /80 (BP Location: Left arm, Patient Position: Sitting, BP Cuff Size: Adult)   Pulse (!) 57   Temp 36.9 °C (98.5 °F) (Temporal)   Ht 1.778 m (5' 10\")   Wt 70.6 kg (155 lb 10.3 oz)   SpO2 98%   BMI 22.33 kg/m²      Physical Exam examined alert, awake, oriented, not in distress    Neck-supple, no lymphadenopathy, no thyromegaly  Lungs-clear to " auscultation, no rales, no wheezes  Heart-regular rate and rhythm, no murmur  Extremities-no edema, clubbing, cyanosis, strong  both hands, intact sensation to light touch both hands, no atrophy noted of both hands, positive Tinel's and Phalen's right wrist   Skin-2 cm round hypopigmented macule left side of the [facility] Outpatient Visit on 07/26/2021   Component Date Value Ref Range Status   • WBC 07/26/2021 5.9  4.8 - 10.8 K/uL Final   • RBC 07/26/2021 5.42  4.70 - 6.10 M/uL Final   • Hemoglobin 07/26/2021 15.1  14.0 - 18.0 g/dL Final   • Hematocrit 07/26/2021 46.6  42.0 - 52.0 % Final   • MCV 07/26/2021 86.0  81.4 - 97.8 fL Final   • MCH 07/26/2021 27.9  27.0 - 33.0 pg Final   • MCHC 07/26/2021 32.4* 33.7 - 35.3 g/dL Final   • RDW 07/26/2021 43.8  35.9 - 50.0 fL Final   • Platelet Count 07/26/2021 219  164 - 446 K/uL Final   • MPV 07/26/2021 8.7* 9.0 - 12.9 fL Final   • Neutrophils-Polys 07/26/2021 47.30  44.00 - 72.00 % Final   • Lymphocytes 07/26/2021 36.20  22.00 - 41.00 % Final   • Monocytes 07/26/2021 9.60  0.00 - 13.40 % Final   • Eosinophils 07/26/2021 5.50  0.00 - 6.90 % Final   • Basophils 07/26/2021 1.20  0.00 - 1.80 % Final   • Immature Granulocytes 07/26/2021 0.20  0.00 - 0.90 % Final   • Nucleated RBC 07/26/2021 0.00  /100 WBC Final   • Neutrophils (Absolute) 07/26/2021 2.78  1.82 - 7.42 K/uL Final    Includes immature neutrophils, if present.   • Lymphs (Absolute) 07/26/2021 2.12  1.00 - 4.80 K/uL Final   • Monos (Absolute) 07/26/2021 0.56  0.00 - 0.85 K/uL Final   • Eos (Absolute) 07/26/2021 0.32  0.00 - 0.51 K/uL Final   • Baso (Absolute) 07/26/2021 0.07  0.00 - 0.12 K/uL Final   • Immature Granulocytes (abs) 07/26/2021 0.01  0.00 - 0.11 K/uL Final   • NRBC (Absolute) 07/26/2021 0.00  K/uL Final   • Glycohemoglobin 07/26/2021 5.7* 4.0 - 5.6 % Final    Comment: Increased risk for diabetes:  5.7 -6.4%  Diabetes:  >6.4%  Glycemic control for adults with diabetes:  <7.0%    The  above interpretations are per ADA guidelines.  Diagnosis  of diabetes mellitus on the basis of elevated Hemoglobin A1c  should be confirmed by repeating the Hb A1c test.     • Est Avg Glucose 07/26/2021 117  mg/dL Final    Comment: The eAG calculation is based on the A1c-Derived Daily Glucose  (ADAG) study.  See the ADA's website for additional information.     • Sodium 07/26/2021 141  135 - 145 mmol/L Final   • Potassium 07/26/2021 4.1  3.6 - 5.5 mmol/L Final   • Chloride 07/26/2021 105  96 - 112 mmol/L Final   • Co2 07/26/2021 23  20 - 33 mmol/L Final   • Anion Gap 07/26/2021 13.0  7.0 - 16.0 Final   • Glucose 07/26/2021 120* 65 - 99 mg/dL Final   • Bun 07/26/2021 16  8 - 22 mg/dL Final   • Creatinine 07/26/2021 0.77  0.50 - 1.40 mg/dL Final   • Calcium 07/26/2021 9.3  8.5 - 10.5 mg/dL Final   • AST(SGOT) 07/26/2021 26  12 - 45 U/L Final   • ALT(SGPT) 07/26/2021 24  2 - 50 U/L Final   • Alkaline Phosphatase 07/26/2021 62  30 - 99 U/L Final   • Total Bilirubin 07/26/2021 0.6  0.1 - 1.5 mg/dL Final   • Albumin 07/26/2021 4.6  3.2 - 4.9 g/dL Final   • Total Protein 07/26/2021 7.2  6.0 - 8.2 g/dL Final   • Globulin 07/26/2021 2.6  1.9 - 3.5 g/dL Final   • A-G Ratio 07/26/2021 1.8  g/dL Final   • Cholesterol,Tot 07/26/2021 167  100 - 199 mg/dL Final   • Triglycerides 07/26/2021 64  0 - 149 mg/dL Final   • HDL 07/26/2021 62  >=40 mg/dL Final   • LDL 07/26/2021 92  <100 mg/dL Final   • Fasting Status 07/26/2021 Fasting   Final   • GFR If  07/26/2021 >60  >60 mL/min/1.73 m 2 Final   • GFR If Non  07/26/2021 >60  >60 mL/min/1.73 m 2 Final                        Assessment/Plan:        1. Dyslipidemia  All at target.  Continue cholesterol medication.  - Lipid Profile; Future  - Comp Metabolic Panel; Future  - HEMOGLOBIN A1C; Future  - CBC WITH DIFFERENTIAL; Future  - PROSTATE SPECIFIC AG SCREENING; Future    2. Impaired fasting blood sugar  Fasting blood glucose is still borderline elevated  as well as the hemoglobin A1c.  Continue avoidance of sweets and decreasing the carbs to manage this.  - Lipid Profile; Future  - Comp Metabolic Panel; Future  - HEMOGLOBIN A1C; Future  - CBC WITH DIFFERENTIAL; Future  - PROSTATE SPECIFIC AG SCREENING; Future    3. Mass of urinary bladder  He is scheduled to have TURBT on September 3 to be done by his urologist Dr. Hernandez.    4. Right hand paresthesia  Most likely carpal tunnel syndrome.  Exam positive Tinel's and Phalen's.  We will do conservative measures with carpal tunnel splint to be worn at night when he is sleeping.  If there is no good results or if there is worsening of the problem we will proceed with EMG to confirm the diagnosis and refer to orthopedist thereafter if needed.    5. Tinea  We will treat for possible tinea with terbinafine cream to be applied twice daily.  If there is no improvement or resolution in 2 to 4 weeks time he will let me know.    - terbinafine (LAMISIL) 1 % cream; Apply to affected area daily.  Dispense: 15 g; Refill: 1    6. Screening for prostate cancer  We will do screening PSA with the next blood work.  - Lipid Profile; Future  - Comp Metabolic Panel; Future  - HEMOGLOBIN A1C; Future  - CBC WITH DIFFERENTIAL; Future  - PROSTATE SPECIFIC AG SCREENING; Future      Please note that this dictation was created using voice recognition software. I have worked with consultants from the vendor as well as technical experts from [facility]  [employer] to optimize the interface. I have made every reasonable attempt to correct obvious errors, but I expect that there are errors of grammar and possibly content I did not discover before finalizing the note.

## 2021-08-05 LAB
BACTERIA UR CULT: NORMAL
SIGNIFICANT IND 70042: NORMAL
SITE SITE: NORMAL
SOURCE SOURCE: NORMAL

## 2021-08-16 ENCOUNTER — PRE-ADMISSION TESTING (OUTPATIENT)
Dept: ADMISSIONS | Facility: MEDICAL CENTER | Age: 61
End: 2021-08-16
Attending: UROLOGY
Payer: COMMERCIAL

## 2021-08-16 DIAGNOSIS — Z01.810 PRE-OPERATIVE CARDIOVASCULAR EXAMINATION: ICD-10-CM

## 2021-08-16 DIAGNOSIS — Z01.812 PRE-OPERATIVE LABORATORY EXAMINATION: ICD-10-CM

## 2021-08-16 LAB
ANION GAP SERPL CALC-SCNC: 11 MMOL/L (ref 7–16)
APPEARANCE UR: CLEAR
BASOPHILS # BLD AUTO: 1.1 % (ref 0–1.8)
BASOPHILS # BLD: 0.06 K/UL (ref 0–0.12)
BILIRUB UR QL STRIP.AUTO: NEGATIVE
BUN SERPL-MCNC: 11 MG/DL (ref 8–22)
CALCIUM SERPL-MCNC: 9.6 MG/DL (ref 8.5–10.5)
CHLORIDE SERPL-SCNC: 105 MMOL/L (ref 96–112)
CO2 SERPL-SCNC: 26 MMOL/L (ref 20–33)
COLOR UR: YELLOW
CREAT SERPL-MCNC: 0.71 MG/DL (ref 0.5–1.4)
EKG IMPRESSION: NORMAL
EOSINOPHIL # BLD AUTO: 0.23 K/UL (ref 0–0.51)
EOSINOPHIL NFR BLD: 4.1 % (ref 0–6.9)
ERYTHROCYTE [DISTWIDTH] IN BLOOD BY AUTOMATED COUNT: 41.8 FL (ref 35.9–50)
GLUCOSE SERPL-MCNC: 87 MG/DL (ref 65–99)
GLUCOSE UR STRIP.AUTO-MCNC: NEGATIVE MG/DL
HCT VFR BLD AUTO: 47.4 % (ref 42–52)
HGB BLD-MCNC: 15.4 G/DL (ref 14–18)
IMM GRANULOCYTES # BLD AUTO: 0.02 K/UL (ref 0–0.11)
IMM GRANULOCYTES NFR BLD AUTO: 0.4 % (ref 0–0.9)
INR PPP: 0.94 (ref 0.87–1.13)
KETONES UR STRIP.AUTO-MCNC: NEGATIVE MG/DL
LEUKOCYTE ESTERASE UR QL STRIP.AUTO: NEGATIVE
LYMPHOCYTES # BLD AUTO: 2.09 K/UL (ref 1–4.8)
LYMPHOCYTES NFR BLD: 37.7 % (ref 22–41)
MCH RBC QN AUTO: 27.8 PG (ref 27–33)
MCHC RBC AUTO-ENTMCNC: 32.5 G/DL (ref 33.7–35.3)
MCV RBC AUTO: 85.6 FL (ref 81.4–97.8)
MICRO URNS: NORMAL
MONOCYTES # BLD AUTO: 0.66 K/UL (ref 0–0.85)
MONOCYTES NFR BLD AUTO: 11.9 % (ref 0–13.4)
NEUTROPHILS # BLD AUTO: 2.49 K/UL (ref 1.82–7.42)
NEUTROPHILS NFR BLD: 44.8 % (ref 44–72)
NITRITE UR QL STRIP.AUTO: NEGATIVE
NRBC # BLD AUTO: 0 K/UL
NRBC BLD-RTO: 0 /100 WBC
PH UR STRIP.AUTO: 7.5 [PH] (ref 5–8)
PLATELET # BLD AUTO: 223 K/UL (ref 164–446)
PMV BLD AUTO: 8.5 FL (ref 9–12.9)
POTASSIUM SERPL-SCNC: 3.8 MMOL/L (ref 3.6–5.5)
PROT UR QL STRIP: NEGATIVE MG/DL
PROTHROMBIN TIME: 12.3 SEC (ref 12–14.6)
RBC # BLD AUTO: 5.54 M/UL (ref 4.7–6.1)
RBC UR QL AUTO: NEGATIVE
SODIUM SERPL-SCNC: 142 MMOL/L (ref 135–145)
SP GR UR STRIP.AUTO: 1.02
UROBILINOGEN UR STRIP.AUTO-MCNC: 0.2 MG/DL
WBC # BLD AUTO: 5.6 K/UL (ref 4.8–10.8)

## 2021-08-16 PROCEDURE — 93005 ELECTROCARDIOGRAM TRACING: CPT

## 2021-08-16 PROCEDURE — 85025 COMPLETE CBC W/AUTO DIFF WBC: CPT

## 2021-08-16 PROCEDURE — 36415 COLL VENOUS BLD VENIPUNCTURE: CPT

## 2021-08-16 PROCEDURE — 81003 URINALYSIS AUTO W/O SCOPE: CPT

## 2021-08-16 PROCEDURE — 93010 ELECTROCARDIOGRAM REPORT: CPT | Performed by: INTERNAL MEDICINE

## 2021-08-16 PROCEDURE — 80048 BASIC METABOLIC PNL TOTAL CA: CPT

## 2021-08-16 PROCEDURE — 85610 PROTHROMBIN TIME: CPT

## 2021-08-16 PROCEDURE — 87086 URINE CULTURE/COLONY COUNT: CPT

## 2021-08-16 RX ORDER — ASCORBIC ACID 100 MG
1 TABLET,CHEWABLE ORAL DAILY
COMMUNITY

## 2021-08-16 RX ORDER — CHLORAL HYDRATE 500 MG
1000 CAPSULE ORAL DAILY
COMMUNITY

## 2021-08-16 ASSESSMENT — FIBROSIS 4 INDEX: FIB4 SCORE: 1.5

## 2021-08-18 LAB
BACTERIA UR CULT: NORMAL
SIGNIFICANT IND 70042: NORMAL
SITE SITE: NORMAL
SOURCE SOURCE: NORMAL

## 2021-09-02 ENCOUNTER — ANESTHESIA EVENT (OUTPATIENT)
Dept: SURGERY | Facility: MEDICAL CENTER | Age: 61
End: 2021-09-02
Payer: COMMERCIAL

## 2021-09-02 NOTE — PROGRESS NOTES
"Pharmacy Chemotherapy Calculation:     Pt Name: Leander Saxena  DX: Bladder Cancer     Regimen: Intravesical Gemcitabine  Gemcitabine 2000 mg in  ml instilled intravesically within 3 hours after TURBT  Mika GARCIA, et al. Effect of Intravesical Instillation of Gemcitabine vs Saline Immediately Following Resection of Suspected Low-Grade Non-Muscle-Invasive Bladder Cancer on Tumor Recurrence SWOG  Randomized Clinical Trial. BUBBA. 2018;319(18):8473-2104     Ht 1.778 m (5' 10\")   Wt 71.8 kg (158 lb 4.6 oz)   BMI 22.71 kg/m²   Body surface area is 1.88 meters squared.    LABS: not required      Drug Order   (Drug name, dose, route, IV Fluid & volume, frequency, number of doses) Cycle 1      Previous treatment: N/a      Medication = Gemcitabine (Gemzar)  Base Dose= 1000 mg  Calc Dose: Fixed dose, no calculation required  Final Dose = 1000 mg x 2 syringes  Route = intravesically   Fluid & Volume = NS 50 mL x 2 syringes  Admin Duration = To be administered by MD    To be administered by MD        Fixed dose, no calculation required. Okay to treat with final dose.      By my signature below, I confirm this process was performed independently with the BSA and all final chemotherapy dosing calculations congruent. I have reviewed the above chemotherapy order and that my calculation of the final dose and BSA (when applicable) corroborate those calculations of the  pharmacist. Discrepancies of 10% or greater in the written dose have been addressed and documented within the EPIC Progress notes.    Clayton Robertson, PharmD    "

## 2021-09-02 NOTE — PROGRESS NOTES
Pt Name: Leander Saxena  DX: Bladder Cancer     Cycle 1  Previous treatment = N/A     Regimen: Intravesical Gemcitabine  Gemcitabine 2000 mg in  ml instilled intravesically within 3 hours after TURBT  gemcitabine 1,000 mg in NS 50 mL syringe x 2    Mika GARCIA, et al. Effect of Intravesical Instillation of Gemcitabine vs Saline Immediately Following Resection of Suspected Low-Grade Non-Muscle-Invasive Bladder Cancer on Tumor Recurrence SWOG  Randomized Clinical Trial. BUBBA. 2018;319(18):8502-3027     LABS: not required      Gemcitabine 1000 mg in NS 50ml intravesically via cath tipped syringe x 2 syringes              Fixed dose, no calculation required. To be administered by MD. Mary Ellen Patrick, DebD, BCPS

## 2021-09-03 ENCOUNTER — HOSPITAL ENCOUNTER (OUTPATIENT)
Facility: MEDICAL CENTER | Age: 61
End: 2021-09-03
Attending: UROLOGY | Admitting: UROLOGY
Payer: COMMERCIAL

## 2021-09-03 ENCOUNTER — ANESTHESIA (OUTPATIENT)
Dept: SURGERY | Facility: MEDICAL CENTER | Age: 61
End: 2021-09-03
Payer: COMMERCIAL

## 2021-09-03 VITALS
OXYGEN SATURATION: 93 % | BODY MASS INDEX: 22.16 KG/M2 | HEART RATE: 66 BPM | HEIGHT: 70 IN | RESPIRATION RATE: 18 BRPM | DIASTOLIC BLOOD PRESSURE: 78 MMHG | SYSTOLIC BLOOD PRESSURE: 154 MMHG | WEIGHT: 154.76 LBS | TEMPERATURE: 97.6 F

## 2021-09-03 LAB — PATHOLOGY CONSULT NOTE: NORMAL

## 2021-09-03 PROCEDURE — 160035 HCHG PACU - 1ST 60 MINS PHASE I: Performed by: UROLOGY

## 2021-09-03 PROCEDURE — 700105 HCHG RX REV CODE 258: Performed by: UROLOGY

## 2021-09-03 PROCEDURE — 160025 RECOVERY II MINUTES (STATS): Performed by: UROLOGY

## 2021-09-03 PROCEDURE — 700105 HCHG RX REV CODE 258: Performed by: ANESTHESIOLOGY

## 2021-09-03 PROCEDURE — 500042 HCHG BAG, URINARY DRAINAGE (CLOSED): Performed by: UROLOGY

## 2021-09-03 PROCEDURE — 501329 HCHG SET, CYSTO IRRIG Y TUR: Performed by: UROLOGY

## 2021-09-03 PROCEDURE — 700111 HCHG RX REV CODE 636 W/ 250 OVERRIDE (IP): Performed by: ANESTHESIOLOGY

## 2021-09-03 PROCEDURE — A9270 NON-COVERED ITEM OR SERVICE: HCPCS | Performed by: UROLOGY

## 2021-09-03 PROCEDURE — 160048 HCHG OR STATISTICAL LEVEL 1-5: Performed by: UROLOGY

## 2021-09-03 PROCEDURE — 160036 HCHG PACU - EA ADDL 30 MINS PHASE I: Performed by: UROLOGY

## 2021-09-03 PROCEDURE — 160002 HCHG RECOVERY MINUTES (STAT): Performed by: UROLOGY

## 2021-09-03 PROCEDURE — 160039 HCHG SURGERY MINUTES - EA ADDL 1 MIN LEVEL 3: Performed by: UROLOGY

## 2021-09-03 PROCEDURE — 700101 HCHG RX REV CODE 250: Performed by: ANESTHESIOLOGY

## 2021-09-03 PROCEDURE — 500879 HCHG PACK, CYSTO: Performed by: UROLOGY

## 2021-09-03 PROCEDURE — A9270 NON-COVERED ITEM OR SERVICE: HCPCS | Performed by: ANESTHESIOLOGY

## 2021-09-03 PROCEDURE — 700102 HCHG RX REV CODE 250 W/ 637 OVERRIDE(OP): Performed by: UROLOGY

## 2021-09-03 PROCEDURE — 160009 HCHG ANES TIME/MIN: Performed by: UROLOGY

## 2021-09-03 PROCEDURE — 700102 HCHG RX REV CODE 250 W/ 637 OVERRIDE(OP): Performed by: ANESTHESIOLOGY

## 2021-09-03 PROCEDURE — 160028 HCHG SURGERY MINUTES - 1ST 30 MINS LEVEL 3: Performed by: UROLOGY

## 2021-09-03 PROCEDURE — 160046 HCHG PACU - 1ST 60 MINS PHASE II: Performed by: UROLOGY

## 2021-09-03 PROCEDURE — 88307 TISSUE EXAM BY PATHOLOGIST: CPT

## 2021-09-03 PROCEDURE — A4338 INDWELLING CATHETER LATEX: HCPCS | Performed by: UROLOGY

## 2021-09-03 RX ORDER — LIDOCAINE HYDROCHLORIDE 20 MG/ML
INJECTION, SOLUTION EPIDURAL; INFILTRATION; INTRACAUDAL; PERINEURAL PRN
Status: DISCONTINUED | OUTPATIENT
Start: 2021-09-03 | End: 2021-09-03 | Stop reason: SURG

## 2021-09-03 RX ORDER — KETOROLAC TROMETHAMINE 30 MG/ML
INJECTION, SOLUTION INTRAMUSCULAR; INTRAVENOUS PRN
Status: DISCONTINUED | OUTPATIENT
Start: 2021-09-03 | End: 2021-09-03 | Stop reason: SURG

## 2021-09-03 RX ORDER — METOCLOPRAMIDE HYDROCHLORIDE 5 MG/ML
INJECTION INTRAMUSCULAR; INTRAVENOUS PRN
Status: DISCONTINUED | OUTPATIENT
Start: 2021-09-03 | End: 2021-09-03 | Stop reason: SURG

## 2021-09-03 RX ORDER — DIPHENHYDRAMINE HYDROCHLORIDE 50 MG/ML
12.5 INJECTION INTRAMUSCULAR; INTRAVENOUS
Status: DISCONTINUED | OUTPATIENT
Start: 2021-09-03 | End: 2021-09-03 | Stop reason: HOSPADM

## 2021-09-03 RX ORDER — ATROPA BELLADONNA AND OPIUM 16.2; 3 MG/1; MG/1
30 SUPPOSITORY RECTAL ONCE
Status: COMPLETED | OUTPATIENT
Start: 2021-09-03 | End: 2021-09-03

## 2021-09-03 RX ORDER — HYDROMORPHONE HYDROCHLORIDE 1 MG/ML
0.1 INJECTION, SOLUTION INTRAMUSCULAR; INTRAVENOUS; SUBCUTANEOUS
Status: DISCONTINUED | OUTPATIENT
Start: 2021-09-03 | End: 2021-09-03 | Stop reason: HOSPADM

## 2021-09-03 RX ORDER — ACETAMINOPHEN 500 MG
1000 TABLET ORAL ONCE
Status: COMPLETED | OUTPATIENT
Start: 2021-09-03 | End: 2021-09-03

## 2021-09-03 RX ORDER — SODIUM CHLORIDE, SODIUM LACTATE, POTASSIUM CHLORIDE, CALCIUM CHLORIDE 600; 310; 30; 20 MG/100ML; MG/100ML; MG/100ML; MG/100ML
INJECTION, SOLUTION INTRAVENOUS CONTINUOUS
Status: ACTIVE | OUTPATIENT
Start: 2021-09-03 | End: 2021-09-03

## 2021-09-03 RX ORDER — DEXAMETHASONE SODIUM PHOSPHATE 4 MG/ML
INJECTION, SOLUTION INTRA-ARTICULAR; INTRALESIONAL; INTRAMUSCULAR; INTRAVENOUS; SOFT TISSUE PRN
Status: DISCONTINUED | OUTPATIENT
Start: 2021-09-03 | End: 2021-09-03 | Stop reason: SURG

## 2021-09-03 RX ORDER — SODIUM CHLORIDE, SODIUM LACTATE, POTASSIUM CHLORIDE, CALCIUM CHLORIDE 600; 310; 30; 20 MG/100ML; MG/100ML; MG/100ML; MG/100ML
INJECTION, SOLUTION INTRAVENOUS CONTINUOUS
Status: DISCONTINUED | OUTPATIENT
Start: 2021-09-03 | End: 2021-09-03 | Stop reason: HOSPADM

## 2021-09-03 RX ORDER — HYDROMORPHONE HYDROCHLORIDE 1 MG/ML
0.4 INJECTION, SOLUTION INTRAMUSCULAR; INTRAVENOUS; SUBCUTANEOUS
Status: DISCONTINUED | OUTPATIENT
Start: 2021-09-03 | End: 2021-09-03 | Stop reason: HOSPADM

## 2021-09-03 RX ORDER — HYDROMORPHONE HYDROCHLORIDE 1 MG/ML
0.2 INJECTION, SOLUTION INTRAMUSCULAR; INTRAVENOUS; SUBCUTANEOUS
Status: DISCONTINUED | OUTPATIENT
Start: 2021-09-03 | End: 2021-09-03 | Stop reason: HOSPADM

## 2021-09-03 RX ORDER — MIDAZOLAM HYDROCHLORIDE 1 MG/ML
1 INJECTION INTRAMUSCULAR; INTRAVENOUS
Status: DISCONTINUED | OUTPATIENT
Start: 2021-09-03 | End: 2021-09-03 | Stop reason: HOSPADM

## 2021-09-03 RX ORDER — ROCURONIUM BROMIDE 10 MG/ML
INJECTION, SOLUTION INTRAVENOUS PRN
Status: DISCONTINUED | OUTPATIENT
Start: 2021-09-03 | End: 2021-09-03 | Stop reason: SURG

## 2021-09-03 RX ORDER — SODIUM CHLORIDE, SODIUM LACTATE, POTASSIUM CHLORIDE, CALCIUM CHLORIDE 600; 310; 30; 20 MG/100ML; MG/100ML; MG/100ML; MG/100ML
INJECTION, SOLUTION INTRAVENOUS
Status: DISCONTINUED | OUTPATIENT
Start: 2021-09-03 | End: 2021-09-03 | Stop reason: SURG

## 2021-09-03 RX ORDER — MIDAZOLAM HYDROCHLORIDE 1 MG/ML
INJECTION INTRAMUSCULAR; INTRAVENOUS PRN
Status: DISCONTINUED | OUTPATIENT
Start: 2021-09-03 | End: 2021-09-03 | Stop reason: SURG

## 2021-09-03 RX ORDER — ONDANSETRON 2 MG/ML
INJECTION INTRAMUSCULAR; INTRAVENOUS PRN
Status: DISCONTINUED | OUTPATIENT
Start: 2021-09-03 | End: 2021-09-03 | Stop reason: SURG

## 2021-09-03 RX ORDER — ONDANSETRON 2 MG/ML
4 INJECTION INTRAMUSCULAR; INTRAVENOUS
Status: DISCONTINUED | OUTPATIENT
Start: 2021-09-03 | End: 2021-09-03 | Stop reason: HOSPADM

## 2021-09-03 RX ORDER — HYDROMORPHONE HYDROCHLORIDE 2 MG/ML
INJECTION, SOLUTION INTRAMUSCULAR; INTRAVENOUS; SUBCUTANEOUS PRN
Status: DISCONTINUED | OUTPATIENT
Start: 2021-09-03 | End: 2021-09-03 | Stop reason: SURG

## 2021-09-03 RX ORDER — CEFAZOLIN SODIUM 1 G/3ML
INJECTION, POWDER, FOR SOLUTION INTRAMUSCULAR; INTRAVENOUS PRN
Status: DISCONTINUED | OUTPATIENT
Start: 2021-09-03 | End: 2021-09-03 | Stop reason: SURG

## 2021-09-03 RX ADMIN — DEXAMETHASONE SODIUM PHOSPHATE 8 MG: 4 INJECTION, SOLUTION INTRA-ARTICULAR; INTRALESIONAL; INTRAMUSCULAR; INTRAVENOUS; SOFT TISSUE at 07:39

## 2021-09-03 RX ADMIN — PROPOFOL 150 MG: 10 INJECTION, EMULSION INTRAVENOUS at 07:34

## 2021-09-03 RX ADMIN — CEFAZOLIN 2 G: 330 INJECTION, POWDER, FOR SOLUTION INTRAMUSCULAR; INTRAVENOUS at 07:39

## 2021-09-03 RX ADMIN — HYDROMORPHONE HYDROCHLORIDE 0.4 MG: 2 INJECTION, SOLUTION INTRAMUSCULAR; INTRAVENOUS; SUBCUTANEOUS at 08:13

## 2021-09-03 RX ADMIN — SODIUM CHLORIDE, POTASSIUM CHLORIDE, SODIUM LACTATE AND CALCIUM CHLORIDE: 600; 310; 30; 20 INJECTION, SOLUTION INTRAVENOUS at 06:07

## 2021-09-03 RX ADMIN — ACETAMINOPHEN 1000 MG: 500 TABLET ORAL at 06:07

## 2021-09-03 RX ADMIN — ATROPA BELLADONNA AND OPIUM 30 MG: 16.2; 3 SUPPOSITORY RECTAL at 09:44

## 2021-09-03 RX ADMIN — METOCLOPRAMIDE 10 MG: 5 INJECTION, SOLUTION INTRAMUSCULAR; INTRAVENOUS at 07:29

## 2021-09-03 RX ADMIN — SUGAMMADEX 200 MG: 100 INJECTION, SOLUTION INTRAVENOUS at 08:24

## 2021-09-03 RX ADMIN — FENTANYL CITRATE 100 MCG: 50 INJECTION, SOLUTION INTRAMUSCULAR; INTRAVENOUS at 07:31

## 2021-09-03 RX ADMIN — LIDOCAINE HYDROCHLORIDE 70 MG: 20 INJECTION, SOLUTION EPIDURAL; INFILTRATION; INTRACAUDAL at 07:31

## 2021-09-03 RX ADMIN — MIDAZOLAM HYDROCHLORIDE 2 MG: 1 INJECTION, SOLUTION INTRAMUSCULAR; INTRAVENOUS at 07:28

## 2021-09-03 RX ADMIN — HYDROMORPHONE HYDROCHLORIDE 0.4 MG: 2 INJECTION, SOLUTION INTRAMUSCULAR; INTRAVENOUS; SUBCUTANEOUS at 08:03

## 2021-09-03 RX ADMIN — ONDANSETRON 4 MG: 2 INJECTION INTRAMUSCULAR; INTRAVENOUS at 08:17

## 2021-09-03 RX ADMIN — SODIUM CHLORIDE, POTASSIUM CHLORIDE, SODIUM LACTATE AND CALCIUM CHLORIDE: 600; 310; 30; 20 INJECTION, SOLUTION INTRAVENOUS at 07:28

## 2021-09-03 RX ADMIN — ROCURONIUM BROMIDE 40 MG: 10 INJECTION, SOLUTION INTRAVENOUS at 07:35

## 2021-09-03 RX ADMIN — KETOROLAC TROMETHAMINE 30 MG: 30 INJECTION, SOLUTION INTRAMUSCULAR at 08:17

## 2021-09-03 ASSESSMENT — PAIN DESCRIPTION - PAIN TYPE
TYPE: SURGICAL PAIN

## 2021-09-03 ASSESSMENT — PAIN SCALES - GENERAL: PAIN_LEVEL: 0

## 2021-09-03 ASSESSMENT — FIBROSIS 4 INDEX: FIB4 SCORE: 1.48

## 2021-09-03 NOTE — OP REPORT
DATE OF SERVICE:  09/03/2021     PREOPERATIVE DIAGNOSES:  1.  Intermittent gross hematuria.  2.  Left trigonal bladder lesion.     OPERATIONS AND PROCEDURES PERFORMED:  1.  Rigid cystourethroscopy.  2.  Transurethral resection of less than 1 cm bladder tumor with fulguration   of base and surrounding area of discoloration.     SURGEON:  Cristino Muñoz MD     ANESTHESIOLOGIST:  Marlon Vuong MD     ANESTHESIA:  General laryngeal mask.     POSTOPERATIVE DIAGNOSES:  1.  Intermittent gross hematuria.  2.  Left trigonal bladder lesion.     COMPLICATIONS:  None.     DRAINS:  A 22-Kiswahili Taylor to gravity.     ESTIMATED BLOOD LOSS:  10 mL.     SPECIMENS:  Bladder floor biopsy to pathology for permanent section.     INTRAOPERATIVE FINDINGS:  The patient has a 7-8 mm raised lesion in mid   trigone, which was biopsied.  There is also a surrounding area of about 4-5 mm   of whitish discoloration that was cauterized.  This appears to be very benign   on our first review.  Therefore, I did not administer gemcitabine.     INDICATIONS:  The patient is a 61-year-old male with a history of hematuria.    He underwent an office cystoscopy where a small lesion was seen on the mid   trigone and on the left side, an area of associated discoloration.  Rest of   his bladder was normal, although bladder capacity was mall and he does have   lower urinary tract symptoms.  Because of the lesion in the bladder, I   recommended biopsy to rule out cancer.  His urine cytology is negative.  CT   scan showed bladder wall thickening, suggestive of outlet obstruction, and   prior to surgery, discussed the plan for cystoscopy, transurethral resection   and intravesical gemcitabine.  I explained to the patient the risk of the   procedure including but not limited to risk of perioperative urinary tract   infection, urosepsis, risk of urethral strictures with delayed complication of   instrumentation, risk of bleeding, risk of bladder perforation and  the fact   that this could be a benign lesion.  In addition, we discussed the   perioperative risks of deep vein thrombosis, pulmonary embolism, aspiration   pneumonia, heart attack, stroke and death.  Informed consent was given to me   by the patient to proceed.     DESCRIPTION OF PROCEDURE:  After informed consent was obtained, the patient   was brought to the operating room and placed supine.  Bilateral sequential   compression devices in place and operational.  General laryngeal mask   anesthetic administered in normal sterile fashion and the patient received   intravenous Ancef.     The patient was positioned in modified lithotomy and the operative area was   Betadine prepped and draped in usual sterile fashion.  A surgical timeout was   called.  All members of the operative team agree with the patient's name and   procedure to be performed without objections. Attention was directed to the   procedure.     We passed a generously-lubricated 26-Moroccan resectoscope with 30-degree lens   per urethra.  The anterior urethra was normal.  The prostatic fossa measured   4.5 cm in length with elevated median bar and highly vascular bladder neck.    Upon entering the bladder, serial evaluation shows an overall small bladder   capacity and some petechial hemorrhage with overfilling of the bladder.  There   was a 7 mm benign-appearing lesion in the mid trigone and a lightly   discolored area adjacent to it about 4-5 mm.  Subsequently, with loop and   monopolar current, I resected the lesion in the mid trigone, fulgurated the   base, surrounding tissue and the surrounding area.  Because this lesion   appeared to be very benign, I elected not to administer gemcitabine which had   been prepared.  At the end of the case, hemostasis was excellent.  I left the   bladder full, withdrew the scope, and a 22-Moroccan Taylor catheter was placed   without difficulty.  His specimen was submitted to pathology for permanent   section.  He  was awakened in the operating room and transferred to the   recovery room where he arrived in stable condition.        ______________________________  MD JEMAL Clements/JUAN    DD:  09/03/2021 09:41  DT:  09/03/2021 10:57    Job#:  [id number]

## 2021-09-03 NOTE — OR SURGEON
Immediate Post OP Note    PreOp Diagnosis: Bladder lesion      PostOp Diagnosis: As above      Procedure(s):  RIGID CYSTOSCOPY WITH TRANSURETHRAL RESECTION OF less than 1cm BLADDER TUMOR) - MONOPOLAR - Wound Class: Clean Contaminated    Surgeon(s):  Cristino Muñoz M.D.    Anesthesiologist/Type of Anesthesia:  Anesthesiologist: Marlon Vuong M.D./General LMA    Surgical Staff:  Circulator: Libra Serrano R.N.; Rosi Uriostegui R.N.  Scrub Person: Robert Edwards    Specimens removed if any:  ID Type Source Tests Collected by Time Destination   A : BLADDER FLOOR BIOPSY Tissue Bladder PATHOLOGY SPECIMEN Cristino Muñoz M.D. 9/3/2021 0815        Estimated Blood Loss: 10ml    Findings: 7-8 mm lesion on the mid trigone biopsied    Complications: None  Drains: 22 Kiswahili to gravity        9/3/2021 8:51 AM Cristino Muñoz M.D.

## 2021-09-03 NOTE — ANESTHESIA PREPROCEDURE EVALUATION
[facility] H&P:  PAST MEDICAL HISTORY:   61 y.o. male who presents for Procedure(s):  TURBT (TRANSURETHRAL RESECTION OF BLADDER TUMOR) - BIPOLAR WITH INTRAVESICAL GEMCITABINE TREATMENT.  He has current and past medical problems significant for:    NPO > 12 hours  Hx smoking, stopped 2 months ago  BPH    Patient denies history of seizures or strokes  Patient denies history of diabetes or thyroid disease  Patient denies history of GERD or esophageal disease  Patient denies history of MITCHEL  Patient denies history of previous MI, chest pain or shortness of breath  Patient denies history of renal failure  Patient denies history of upper or lower extremity motor/sensory deficits   Patient denies history of bleeding disorders  Patient denies history of complications with anesthesia    Able to climb 2 flights of stair without dyspnea or angina, > 4 METs     Past Medical History:   Diagnosis Date   • Dyslipidemia    • High cholesterol    • Impaired fasting blood sugar        SMOKING/ALCOHOL/RECREATIONAL DRUG USE:  Social History     Tobacco Use   • Smoking status: Current Every Day Smoker     Packs/day: 0.30     Years: 30.00     Pack years: 9.00     Types: Cigarettes   • Smokeless tobacco: Never Used   • Tobacco comment: 4 cig/day   Vaping Use   • Vaping Use: Never used   Substance Use Topics   • Alcohol use: Yes     Alcohol/week: 1.2 oz     Types: 1 Cans of beer, 1 Shots of liquor per week     Comment: rui   • Drug use: Yes     Types: Marijuana, Inhaled     Comment: used cocaine in the past, marijuana 2-3x/week     Social History     Substance and Sexual Activity   Drug Use Yes   • Types: Marijuana, Inhaled    Comment: used cocaine in the past, marijuana 2-3x/week       PAST SURGICAL HISTORY:  Past Surgical History:   Procedure Laterality Date   • COLONOSCOPY WITH POLYP  6/19/13    asc colon polyp-adenomatous, mild diverticulosis asc colon       ALLERGIES:   No Known Allergies    MEDICATIONS:  No current facility-administered  medications on file prior to encounter.     Current Outpatient Medications on File Prior to Encounter   Medication Sig Dispense Refill   • atorvastatin (LIPITOR) 20 MG Tab take 1 tablet by mouth every evening. 90 tablet 1       LABS:  Lab Results   Component Value Date/Time    HEMOGLOBIN 15.4 08/16/2021 1151    HEMATOCRIT 47.4 08/16/2021 1151    WBC 5.6 08/16/2021 1151     Lab Results   Component Value Date/Time    SODIUM 142 08/16/2021 1151    POTASSIUM 3.8 08/16/2021 1151    CHLORIDE 105 08/16/2021 1151    CO2 26 08/16/2021 1151    GLUCOSE 87 08/16/2021 1151    BUN 11 08/16/2021 1151    CALCIUM 9.6 08/16/2021 1151       SARS-CoV2 result: Negative      PREVIOUS ANESTHETICS: See EMR  __________________________________________      Relevant Problems   No relevant active problems       Physical Exam    Airway   Mallampati: II  TM distance: >3 FB  Neck ROM: full       Cardiovascular - normal exam  Rhythm: regular  Rate: normal  (-) murmur     Dental - normal exam           Pulmonary - normal exam  Breath sounds clear to auscultation     Abdominal    Neurological - normal exam                 Anesthesia Plan    ASA 2       Plan - general       Airway plan will be ETT          Induction: intravenous    Postoperative Plan: Postoperative administration of opioids is intended.    Pertinent diagnostic labs and testing reviewed    Informed Consent:    Anesthetic plan and risks discussed with patient.    Use of blood products discussed with: patient whom consented to blood products.

## 2021-09-03 NOTE — ANESTHESIA TIME REPORT
Anesthesia Start and Stop Event Times     Date Time Event    9/3/2021 0716 Ready for Procedure     0728 Anesthesia Start     0840 Anesthesia Stop        Responsible Staff  09/03/21    Name Role Begin End    Marlon Vuong M.D. Anesth 0728 0840        Preop Diagnosis (Free Text):  Pre-op Diagnosis     MASS OF URINARY BLADDER        Preop Diagnosis (Codes):    Post op Diagnosis  Mass of urinary bladder      Premium Reason  Non-Premium    Comments:

## 2021-09-03 NOTE — OR NURSING
Pt's VSS; denies N/V; states no pain to lobo cath site. Catheter remains intact and secured in place with STAT Lock. D/c orders received. IV dc'd. Pt changed into clothing with assistance. Pt up and ambulated, steady gait. Discharge instructions given as well as pain management handout; pt and family verbalized understanding and questions answered. Patient states ready to d/c home. No prescriptions given. Pt dc'd in w/c with RN in stable condition.    Lobo instructions/care given and performed. Patient understands how/when to use leg back and how to care for catheter.

## 2021-09-03 NOTE — OR NURSING
Call out to . Updated on pt's output from lobo and color - arrived to pacu with bloody output and now clearing to light red color.  Received order for B&O suppository and okay to d/c pt with pink to light red urine output with no clots.

## 2021-09-03 NOTE — DISCHARGE INSTRUCTIONS
ACTIVITY: Rest and take it easy for the first 24 hours.  A responsible adult is recommended to remain with you during that time.  It is normal to feel sleepy.  We encourage you to not do anything that requires balance, judgment or coordination.    MILD FLU-LIKE SYMPTOMS ARE NORMAL. YOU MAY EXPERIENCE GENERALIZED MUSCLE ACHES, THROAT IRRITATION, HEADACHE AND/OR SOME NAUSEA.    FOR 24 HOURS DO NOT:  Drive, operate machinery or run household appliances.  Drink beer or alcoholic beverages.   Make important decisions or sign legal documents.    SPECIAL INSTRUCTIONS:   Transurethral Resection of Bladder Tumor, Care After  This sheet gives you information about how to care for yourself after your procedure. Your health care provider may also give you more specific instructions. If you have problems or questions, contact your health care provider.  What can I expect after the procedure?  After the procedure, it is common to have:  · A small amount of blood in your urine for up to 2 weeks.  · Soreness or mild pain from your catheter. After your catheter is removed, you may have mild soreness, especially when urinating.  · Pain in your lower abdomen.  Follow these instructions at home:  Medicines  · Take over-the-counter and prescription medicines only as told by your health care provider.  · If you were prescribed an antibiotic medicine, take it as told by your health care provider. Do not stop taking the antibiotic even if you start to feel better.  · Do not drive for 24 hours if you were given a sedative during your procedure.  · Ask your health care provider if the medicine prescribed to you:  ? Requires you to avoid driving or using heavy machinery.  ? Can cause constipation. You may need to take these actions to prevent or treat constipation:  § Take over-the-counter or prescription medicines.  § Eat foods that are high in fiber, such as beans, whole grains, and fresh fruits and vegetables.  § Limit foods that are  high in fat and processed sugars, such as fried or sweet foods.  Activity  · Return to your normal activities as told by your health care provider. Ask your health care provider what activities are safe for you.  · Do not lift anything that is heavier than 10 lb (4.5 kg), or the limit that you are told, until your health care provider says that it is safe.  · Avoid intense physical activity for as long as told by your health care provider.  · Rest as told by your health care provider.  · Avoid sitting for a long time without moving. Get up to take short walks every 1-2 hours. This is important to improve blood flow and breathing. Ask for help if you feel weak or unsteady.  General instructions    · Do not drink alcohol for as long as told by your health care provider. This is especially important if you are taking prescription pain medicines.  · Do not take baths, swim, or use a hot tub until your health care provider approves. Ask your health care provider if you may take showers. You may only be allowed to take sponge baths.  · If you have a catheter, follow instructions from your health care provider about caring for your catheter and your drainage bag.  · Drink enough fluid to keep your urine pale yellow.  · Wear compression stockings as told by your health care provider. These stockings help to prevent blood clots and reduce swelling in your legs.  · Keep all follow-up visits as told by your health care provider. This is important.  ? You will need to be followed closely with regular checks of your bladder and urethra (cystoscopies) to make sure that the cancer does not come back.  Contact a health care provider if:  · You have pain that gets worse or does not improve with medicine.  · You have blood in your urine for more than 2 weeks.  · You have cloudy or bad-smelling urine.  · You become constipated. Signs of constipation may include having:  ? Fewer than three bowel movements in a week.  ? Difficulty  having a bowel movement.  ? Stools that are dry, hard, or larger than normal.  · You have a fever.  Get help right away if:  · You have:  ? Severe pain.  ? Bright red blood in your urine.  ? Blood clots in your urine.  ? A lot of blood in your urine.  · Your catheter has been removed and you are not able to urinate.  · You have a catheter in place and the catheter is not draining urine.  Summary  · After your procedure, it is common to have a small amount of blood in your urine, soreness or mild pain from your catheter, and pain in your lower abdomen.  · Take over-the-counter and prescription medicines only as told by your health care provider.  · Rest as told by your health care provider. Follow your health care provider's instructions about returning to normal activities. Ask what activities are safe for you.  · If you have a catheter, follow instructions from your health care provider about caring for your catheter and your drainage bag.  · Get help right away if you cannot urinate, you have severe pain, or you have bright red blood or blood clots in your urine.  This information is not intended to replace advice given to you by your health care provider. Make sure you discuss any questions you have with your health care provider.  Document Released: 11/28/2016 Document Revised: 07/18/2019 Document Reviewed: 07/18/2019  [employer] Patient Education © 2020 [employer]      Indwelling Urinary Catheter Insertion, Care After  This sheet gives you information about how to care for yourself after your procedure. Your health care provider may also give you more specific instructions. If you have problems or questions, contact your health care provider.  What can I expect after the procedure?  After the procedure, it is common to have:  · Slight discomfort around your urethra where the catheter enters your body.  Follow these instructions at home:    · Keep the drainage bag at or below the level of your bladder. Doing  this ensures that urine can only drain out, not back into your body.  · Secure the catheter tubing and drainage bag to your leg or thigh to keep it from moving.  · Check the catheter tubing regularly to make sure there are no kinks or blockages.  · Take showers daily to keep the catheter clean. Do not take a bath.  · Do not pull on your catheter or try to remove it.  · Disconnect the tubing and drainage bag as little as possible.  · Empty the drainage bag every 2-4 hours, or more often if needed. Do not let the bag get completely full.  · Wash your hands with soap and water before and after touching the catheter, tubing, or drainage bag.  · Do not let the drainage bag or catheter tubing touch the floor.  · Drink enough fluids to keep your urine clear or pale yellow, or as told by your health care provider.  Contact a health care provider if:  · Urine stops flowing into the drainage bag.  · You feel pain or pressure in the bladder area.  · You have back pain.  · Your catheter gets clogged.  · Your catheter starts to leak.  · Your urine looks cloudy.  · Your drainage bag or tubing looks dirty.  · You notice a bad smell when emptying your drainage bag.  Get help right away if:  · You have a fever or chills.  · You have severe pain in your back or your lower abdomen.  · You have warmth, redness, swelling, or pain in the urethra area.  · You notice blood in your urine.  · Your catheter gets pulled out.  Summary  · Do not pull on your catheter or try to remove it.  · Keep the drainage bag at or below the level of your bladder, but do not let the drainage bag or catheter tubing touch the floor.  · Wash your hands with soap and water before and after touching the catheter, tubing, or drainage bag.  · Contact your health care provider if you have a fever, chills, or any other signs of infection.  This information is not intended to replace advice given to you by your health care provider. Make sure you discuss any questions  you have with your health care provider.  Document Released: 01/27/2018 Document Revised: 04/10/2020 Document Reviewed: 01/27/2018  Elsevier Patient Education © 2020 [employer]      DIET: To avoid nausea, slowly advance diet as tolerated, avoiding spicy or greasy foods for the first day.  Add more substantial food to your diet according to your physician's instructions.  Babies can be fed formula or breast milk as soon as they are hungry.  INCREASE FLUIDS AND FIBER TO AVOID CONSTIPATION.    SURGICAL DRESSING/BATHING: NA    FOLLOW-UP APPOINTMENT:  A follow-up appointment should be arranged with your doctor in 9/7 at 9:00am; call to schedule.    You should CALL YOUR PHYSICIAN if you develop:  Fever greater than 101 degrees F.  Pain not relieved by medication, or persistent nausea or vomiting.  Excessive bleeding (blood soaking through dressing) or unexpected drainage from the wound.  Extreme redness or swelling around the incision site, drainage of pus or foul smelling drainage.  Inability to urinate or empty your bladder within 8 hours.  Problems with breathing or chest pain.    You should call 911 if you develop problems with breathing or chest pain.  If you are unable to contact your doctor or surgical center, you should go to the nearest emergency room or urgent care center.  Physician's telephone #: *Dr. Muñoz [phone]*    If any questions arise, call your doctor.  If your doctor is not available, please feel free to call the Surgical Center at [phone]. The Contact Center is open Monday through Friday 7AM to 5PM and may speak to a nurse at [phone], or toll free at [phone].     A registered nurse may call you a few days after your surgery to see how you are doing after your procedure.    MEDICATIONS: Resume taking daily medication.  Take prescribed pain medication with food.  If no medication is prescribed, you may take non-aspirin pain medication if needed.  PAIN MEDICATION CAN BE VERY  CONSTIPATING.  Take a stool softener or laxative such as senokot, pericolace, or milk of magnesia if needed.      If your physician has prescribed pain medication that includes Acetaminophen (Tylenol), do not take additional Acetaminophen (Tylenol) while taking the prescribed medication.    Depression / Suicide Risk    As you are discharged from this [facility] facility, it is important to learn how to keep safe from harming yourself.    Recognize the warning signs:  · Abrupt changes in personality, positive or negative- including increase in energy   · Giving away possessions  · Change in eating patterns- significant weight changes-  positive or negative  · Change in sleeping patterns- unable to sleep or sleeping all the time   · Unwillingness or inability to communicate  · Depression  · Unusual sadness, discouragement and loneliness  · Talk of wanting to die  · Neglect of personal appearance   · Rebelliousness- reckless behavior  · Withdrawal from people/activities they love  · Confusion- inability to concentrate     If you or a loved one observes any of these behaviors or has concerns about self-harm, here's what you can do:  · Talk about it- your feelings and reasons for harming yourself  · Remove any means that you might use to hurt yourself (examples: pills, rope, extension cords, firearm)  · Get professional help from the community (Mental Health, Substance Abuse, psychological counseling)  · Do not be alone:Call your Safe Contact- someone whom you trust who will be there for you.  · Call your local CRISIS HOTLINE 776-8638 or 059-941-0592  · Call your local Children's Mobile Crisis Response Team [place] (728) 512-1312 or www.[place]  · Call the toll free National Suicide Prevention Hotlines   · National Suicide Prevention Lifeline 772-915-QAVI (8006)  ·

## 2021-09-03 NOTE — ANESTHESIA POSTPROCEDURE EVALUATION
Patient: Leander Saxena    Procedure Summary     Date: 09/03/21 Room / Location: [place] [address] / SURGERY [facility]    Anesthesia Start: 0728 Anesthesia Stop: 0840    Procedure: TURBT (TRANSURETHRAL RESECTION OF BLADDER TUMOR) - MONOPOLAR (Bladder) Diagnosis: (MASS OF URINARY BLADDER)    Surgeons: Cristino Muñoz M.D. Responsible Provider: Marlon Vuong M.D.    Anesthesia Type: general ASA Status: 2          Final Anesthesia Type: general  Last vitals  BP   Blood Pressure: 151/85    Temp   36.4 °C (97.6 °F)    Pulse   60   Resp   16    SpO2   98 %      Anesthesia Post Evaluation    Patient location during evaluation: PACU  Patient participation: complete - patient participated  Level of consciousness: sleepy but conscious  Pain score: 0    Airway patency: patent  Anesthetic complications: no  Cardiovascular status: hemodynamically stable  Respiratory status: acceptable  Hydration status: euvolemic  Comments: No complaints at this time.     PONV: none          No complications documented.     Nurse Pain Score: 0 (NPRS)

## 2021-09-03 NOTE — ANESTHESIA PROCEDURE NOTES
Airway    Date/Time: 9/3/2021 7:38 AM  Performed by: Marlon Vuong M.D.  Authorized by: Marlon Vuong M.D.     Location:  OR  Urgency:  Elective  Indications for Airway Management:  Anesthesia      Spontaneous Ventilation: absent    Sedation Level:  Deep  Preoxygenated: Yes    Patient Position:  Sniffing  Final Airway Type:  Endotracheal airway  Final Endotracheal Airway:  ETT  Cuffed: Yes    Technique Used for Successful ETT Placement:  Direct laryngoscopy    Insertion Site:  Oral  Blade Type:  Glide  Laryngoscope Blade/Videolaryngoscope Blade Size:  4  ETT Size (mm):  7.5  Measured from:  Lips  ETT to Lips (cm):  23  Placement Verified by: auscultation and capnometry    Cormack-Lehane Classification:  Grade I - full view of glottis  Number of Attempts at Approach:  1   g3v with CL mac 3, bag mask ventilated with oral airway between attempts, second attempt with glide scope, atraumatic, anterior

## 2022-02-02 ENCOUNTER — HOSPITAL ENCOUNTER (OUTPATIENT)
Dept: LAB | Facility: MEDICAL CENTER | Age: 62
End: 2022-02-02
Attending: FAMILY MEDICINE
Payer: COMMERCIAL

## 2022-02-02 DIAGNOSIS — R73.01 IMPAIRED FASTING GLUCOSE: ICD-10-CM

## 2022-02-02 DIAGNOSIS — Z12.5 SCREENING FOR PROSTATE CANCER: ICD-10-CM

## 2022-02-02 DIAGNOSIS — E78.5 DYSLIPIDEMIA: ICD-10-CM

## 2022-02-02 LAB
ALBUMIN SERPL BCP-MCNC: 4.9 G/DL (ref 3.2–4.9)
ALBUMIN/GLOB SERPL: 2.1 G/DL
ALP SERPL-CCNC: 64 U/L (ref 30–99)
ALT SERPL-CCNC: 32 U/L (ref 2–50)
ANION GAP SERPL CALC-SCNC: 10 MMOL/L (ref 7–16)
AST SERPL-CCNC: 37 U/L (ref 12–45)
BASOPHILS # BLD AUTO: 1.6 % (ref 0–1.8)
BASOPHILS # BLD: 0.09 K/UL (ref 0–0.12)
BILIRUB SERPL-MCNC: 0.7 MG/DL (ref 0.1–1.5)
BUN SERPL-MCNC: 13 MG/DL (ref 8–22)
CALCIUM SERPL-MCNC: 9.4 MG/DL (ref 8.5–10.5)
CHLORIDE SERPL-SCNC: 103 MMOL/L (ref 96–112)
CHOLEST SERPL-MCNC: 141 MG/DL (ref 100–199)
CO2 SERPL-SCNC: 27 MMOL/L (ref 20–33)
CREAT SERPL-MCNC: 0.7 MG/DL (ref 0.5–1.4)
EOSINOPHIL # BLD AUTO: 0.39 K/UL (ref 0–0.51)
EOSINOPHIL NFR BLD: 6.9 % (ref 0–6.9)
ERYTHROCYTE [DISTWIDTH] IN BLOOD BY AUTOMATED COUNT: 42 FL (ref 35.9–50)
EST. AVERAGE GLUCOSE BLD GHB EST-MCNC: 126 MG/DL
FASTING STATUS PATIENT QL REPORTED: NORMAL
GLOBULIN SER CALC-MCNC: 2.3 G/DL (ref 1.9–3.5)
GLUCOSE SERPL-MCNC: 113 MG/DL (ref 65–99)
HBA1C MFR BLD: 6 % (ref 4–5.6)
HCT VFR BLD AUTO: 46.5 % (ref 42–52)
HDLC SERPL-MCNC: 57 MG/DL
HGB BLD-MCNC: 15.2 G/DL (ref 14–18)
IMM GRANULOCYTES # BLD AUTO: 0.01 K/UL (ref 0–0.11)
IMM GRANULOCYTES NFR BLD AUTO: 0.2 % (ref 0–0.9)
LDLC SERPL CALC-MCNC: 68 MG/DL
LYMPHOCYTES # BLD AUTO: 2.24 K/UL (ref 1–4.8)
LYMPHOCYTES NFR BLD: 39.9 % (ref 22–41)
MCH RBC QN AUTO: 27.7 PG (ref 27–33)
MCHC RBC AUTO-ENTMCNC: 32.7 G/DL (ref 33.7–35.3)
MCV RBC AUTO: 84.9 FL (ref 81.4–97.8)
MONOCYTES # BLD AUTO: 0.67 K/UL (ref 0–0.85)
MONOCYTES NFR BLD AUTO: 11.9 % (ref 0–13.4)
NEUTROPHILS # BLD AUTO: 2.22 K/UL (ref 1.82–7.42)
NEUTROPHILS NFR BLD: 39.5 % (ref 44–72)
NRBC # BLD AUTO: 0 K/UL
NRBC BLD-RTO: 0 /100 WBC
PLATELET # BLD AUTO: 252 K/UL (ref 164–446)
PMV BLD AUTO: 8.8 FL (ref 9–12.9)
POTASSIUM SERPL-SCNC: 4.2 MMOL/L (ref 3.6–5.5)
PROT SERPL-MCNC: 7.2 G/DL (ref 6–8.2)
PSA SERPL-MCNC: 1.74 NG/ML (ref 0–4)
RBC # BLD AUTO: 5.48 M/UL (ref 4.7–6.1)
SODIUM SERPL-SCNC: 140 MMOL/L (ref 135–145)
TRIGL SERPL-MCNC: 80 MG/DL (ref 0–149)
WBC # BLD AUTO: 5.6 K/UL (ref 4.8–10.8)

## 2022-02-02 PROCEDURE — 84153 ASSAY OF PSA TOTAL: CPT

## 2022-02-02 PROCEDURE — 80053 COMPREHEN METABOLIC PANEL: CPT

## 2022-02-02 PROCEDURE — 36415 COLL VENOUS BLD VENIPUNCTURE: CPT

## 2022-02-02 PROCEDURE — 80061 LIPID PANEL: CPT

## 2022-02-02 PROCEDURE — 83036 HEMOGLOBIN GLYCOSYLATED A1C: CPT

## 2022-02-02 PROCEDURE — 85025 COMPLETE CBC W/AUTO DIFF WBC: CPT

## 2022-02-07 ENCOUNTER — OFFICE VISIT (OUTPATIENT)
Dept: MEDICAL GROUP | Facility: PHYSICIAN GROUP | Age: 62
End: 2022-02-07
Payer: COMMERCIAL

## 2022-02-07 VITALS
OXYGEN SATURATION: 97 % | WEIGHT: 157.85 LBS | BODY MASS INDEX: 22.6 KG/M2 | SYSTOLIC BLOOD PRESSURE: 126 MMHG | TEMPERATURE: 97.1 F | DIASTOLIC BLOOD PRESSURE: 60 MMHG | HEIGHT: 70 IN | HEART RATE: 67 BPM

## 2022-02-07 DIAGNOSIS — R20.2 RIGHT HAND PARESTHESIA: ICD-10-CM

## 2022-02-07 DIAGNOSIS — M25.562 CHRONIC PAIN OF BOTH KNEES: ICD-10-CM

## 2022-02-07 DIAGNOSIS — R73.01 IMPAIRED FASTING GLUCOSE: ICD-10-CM

## 2022-02-07 DIAGNOSIS — G89.29 CHRONIC PAIN OF BOTH KNEES: ICD-10-CM

## 2022-02-07 DIAGNOSIS — N32.89 MASS OF URINARY BLADDER: ICD-10-CM

## 2022-02-07 DIAGNOSIS — M25.561 CHRONIC PAIN OF BOTH KNEES: ICD-10-CM

## 2022-02-07 DIAGNOSIS — E78.5 DYSLIPIDEMIA: ICD-10-CM

## 2022-02-07 PROCEDURE — 99214 OFFICE O/P EST MOD 30 MIN: CPT | Performed by: FAMILY MEDICINE

## 2022-02-07 RX ORDER — TAMSULOSIN HYDROCHLORIDE 0.4 MG/1
0.4 CAPSULE ORAL
COMMUNITY
Start: 2021-12-31 | End: 2022-09-01

## 2022-02-07 RX ORDER — TAMSULOSIN HYDROCHLORIDE 0.4 MG/1
CAPSULE ORAL
COMMUNITY
Start: 2021-09-23 | End: 2022-09-01

## 2022-02-07 ASSESSMENT — PATIENT HEALTH QUESTIONNAIRE - PHQ9: CLINICAL INTERPRETATION OF PHQ2 SCORE: 0

## 2022-02-07 ASSESSMENT — FIBROSIS 4 INDEX: FIB4 SCORE: 1.61

## 2022-02-07 NOTE — PROGRESS NOTES
"Subjective     Leander Saxena is a 61 y.o. male who presents with Hyperlipidemia            HPI     Patient returns for follow-up of his medical problems.    In terms of the dyslipidemia, he continues to take atorvastatin without myalgias.  Blood work was done before this visit.    We continue to follow him for impaired fasting blood sugar and he manages this by watching his diet.    He was complaining of tingling of the fingertips of the right hand when I saw him 6 months ago.  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  I recommended carpal tunnel splint but he said he was exercising his right hand by squeezing a rubber ball and the problem resolved.    He underwent TURBT with excision of the bladder mass in September 2021.  Pathology report came back benign.  He has not had any problems with gross hematuria.  He said his urine flow has always been diminished and not as strong.  He is taking medication prescribed by the urologist to help manage this with help.    He is complaining of sharp pain in the medial joint line of both knees worse on right than the left.  This is noted when he is at rest.  He does not feel it when he is walking or exercising on the treadmill.  This has been ongoing for about 6 months now.  Denies any trauma to the knees.    Past medical history, past surgical history, family history reviewed-no changes    Social history reviewed-no changes    Allergies reviewed-no changes    Medications reviewed-no changes    ROS   As per HPI, the rest are negative.             Objective     /60 (BP Location: Left arm, Patient Position: Sitting, BP Cuff Size: Adult)   Pulse 67   Temp 36.2 °C (97.1 °F) (Temporal)   Ht 1.778 m (5' 10\")   Wt 71.6 kg (157 lb 13.6 oz)   SpO2 97%   BMI 22.65 kg/m²      Physical Exam     Examined alert, awake, oriented, not in distress    Neck-supple, no lymphadenopathy, no thyromegaly  Lungs-clear to auscultation, no rales, no " wheezes  Heart-regular rate and rhythm, no murmur  Extremities-no edema, clubbing, cyanosis, no evidence of any effusion both knees, no pinpoint tenderness, there is clicking/crunching of both knees on passive flexion and extension more noticeable in the left knee, no laxity, no instability of both knees             Hospital Outpatient Visit on 02/02/2022   Component Date Value Ref Range Status   • Prostatic Specific Antigen Tot 02/02/2022 1.74  0.00 - 4.00 ng/mL Final    Comment: Performed using Roche nancy e immunoassay analyzer. tPSA values determined  on patient samples by different testing procedures cannot be directly  compared with one another and could be the cause of erroneous medical  interpretations. If there is a change in the tPSA assay procedure used while  monitoring therapy, then new baselines may need to be established when  comparing previous results.     • WBC 02/02/2022 5.6  4.8 - 10.8 K/uL Final   • RBC 02/02/2022 5.48  4.70 - 6.10 M/uL Final   • Hemoglobin 02/02/2022 15.2  14.0 - 18.0 g/dL Final   • Hematocrit 02/02/2022 46.5  42.0 - 52.0 % Final   • MCV 02/02/2022 84.9  81.4 - 97.8 fL Final   • MCH 02/02/2022 27.7  27.0 - 33.0 pg Final   • MCHC 02/02/2022 32.7* 33.7 - 35.3 g/dL Final   • RDW 02/02/2022 42.0  35.9 - 50.0 fL Final   • Platelet Count 02/02/2022 252  164 - 446 K/uL Final   • MPV 02/02/2022 8.8* 9.0 - 12.9 fL Final   • Neutrophils-Polys 02/02/2022 39.50* 44.00 - 72.00 % Final   • Lymphocytes 02/02/2022 39.90  22.00 - 41.00 % Final   • Monocytes 02/02/2022 11.90  0.00 - 13.40 % Final   • Eosinophils 02/02/2022 6.90  0.00 - 6.90 % Final   • Basophils 02/02/2022 1.60  0.00 - 1.80 % Final   • Immature Granulocytes 02/02/2022 0.20  0.00 - 0.90 % Final   • Nucleated RBC 02/02/2022 0.00  /100 WBC Final   • Neutrophils (Absolute) 02/02/2022 2.22  1.82 - 7.42 K/uL Final    Includes immature neutrophils, if present.   • Lymphs (Absolute) 02/02/2022 2.24  1.00 - 4.80 K/uL Final   • Monos  (Absolute) 02/02/2022 0.67  0.00 - 0.85 K/uL Final   • Eos (Absolute) 02/02/2022 0.39  0.00 - 0.51 K/uL Final   • Baso (Absolute) 02/02/2022 0.09  0.00 - 0.12 K/uL Final   • Immature Granulocytes (abs) 02/02/2022 0.01  0.00 - 0.11 K/uL Final   • NRBC (Absolute) 02/02/2022 0.00  K/uL Final   • Glycohemoglobin 02/02/2022 6.0* 4.0 - 5.6 % Final    Comment: Increased risk for diabetes:  5.7 -6.4%  Diabetes:  >6.4%  Glycemic control for adults with diabetes:  <7.0%    The above interpretations are per ADA guidelines.  Diagnosis  of diabetes mellitus on the basis of elevated Hemoglobin A1c  should be confirmed by repeating the Hb A1c test.     • Est Avg Glucose 02/02/2022 126  mg/dL Final    Comment: The eAG calculation is based on the A1c-Derived Daily Glucose  (ADAG) study.  See the ADA's website for additional information.     • Sodium 02/02/2022 140  135 - 145 mmol/L Final   • Potassium 02/02/2022 4.2  3.6 - 5.5 mmol/L Final   • Chloride 02/02/2022 103  96 - 112 mmol/L Final   • Co2 02/02/2022 27  20 - 33 mmol/L Final   • Anion Gap 02/02/2022 10.0  7.0 - 16.0 Final   • Glucose 02/02/2022 113* 65 - 99 mg/dL Final   • Bun 02/02/2022 13  8 - 22 mg/dL Final   • Creatinine 02/02/2022 0.70  0.50 - 1.40 mg/dL Final   • Calcium 02/02/2022 9.4  8.5 - 10.5 mg/dL Final   • AST(SGOT) 02/02/2022 37  12 - 45 U/L Final   • ALT(SGPT) 02/02/2022 32  2 - 50 U/L Final   • Alkaline Phosphatase 02/02/2022 64  30 - 99 U/L Final   • Total Bilirubin 02/02/2022 0.7  0.1 - 1.5 mg/dL Final   • Albumin 02/02/2022 4.9  3.2 - 4.9 g/dL Final   • Total Protein 02/02/2022 7.2  6.0 - 8.2 g/dL Final   • Globulin 02/02/2022 2.3  1.9 - 3.5 g/dL Final   • A-G Ratio 02/02/2022 2.1  g/dL Final   • Cholesterol,Tot 02/02/2022 141  100 - 199 mg/dL Final   • Triglycerides 02/02/2022 80  0 - 149 mg/dL Final   • HDL 02/02/2022 57  >=40 mg/dL Final   • LDL 02/02/2022 68  <100 mg/dL Final   • Fasting Status 02/02/2022 Fasting   Final   • GFR If   02/02/2022 >60  >60 mL/min/1.73 m 2 Final   • GFR If Non  02/02/2022 >60  >60 mL/min/1.73 m 2 Final                  Assessment & Plan        1. Dyslipidemia  All at target on atorvastatin.  Continue medication.  - Lipid Profile; Future  - Comp Metabolic Panel; Future  - HEMOGLOBIN A1C; Future    2. Impaired fasting blood sugar  Fasting blood sugar and hemoglobin A1c consistent with prediabetes.  Continue avoidance of sweets and decreasing the carbs.    - Lipid Profile; Future  - Comp Metabolic Panel; Future  - HEMOGLOBIN A1C; Future    3. Right hand paresthesia  Resolved since he has been doing hand exercises using a rubber ball.    4. Mass of urinary bladder  Status post TURBT and excision of the bladder mass which came back bknee pain.  enign.  He is followed by his urologist.      5. Chronic pain of both knees  We will get x-rays of both knees to evaluate the knee pain.  Most likely due to osteoarthritis especially with the above physical exam findings.  Further recommendation will depend on results.  - DX-KNEE COMPLETE 4+ RIGHT; Future  - DX-KNEE COMPLETE 4+ LEFT; Future         Please note that this dictation was created using voice recognition software. I have worked with consultants from the vendor as well as technical experts from [employer] to optimize the interface. I have made every reasonable attempt to correct obvious errors, but I expect that there are errors of grammar and possibly content I did not discover before finalizing the note.

## 2022-02-08 ENCOUNTER — HOSPITAL ENCOUNTER (OUTPATIENT)
Dept: RADIOLOGY | Facility: MEDICAL CENTER | Age: 62
End: 2022-02-08
Attending: FAMILY MEDICINE
Payer: COMMERCIAL

## 2022-02-08 DIAGNOSIS — M25.561 CHRONIC PAIN OF BOTH KNEES: ICD-10-CM

## 2022-02-08 DIAGNOSIS — G89.29 CHRONIC PAIN OF BOTH KNEES: ICD-10-CM

## 2022-02-08 DIAGNOSIS — M25.562 CHRONIC PAIN OF BOTH KNEES: ICD-10-CM

## 2022-02-08 DIAGNOSIS — M17.0 PRIMARY OSTEOARTHRITIS OF BOTH KNEES: ICD-10-CM

## 2022-02-08 PROCEDURE — 73564 X-RAY EXAM KNEE 4 OR MORE: CPT | Mod: RT

## 2022-02-08 PROCEDURE — 73564 X-RAY EXAM KNEE 4 OR MORE: CPT | Mod: LT

## 2022-04-29 ENCOUNTER — HOSPITAL ENCOUNTER (OUTPATIENT)
Dept: LAB | Facility: MEDICAL CENTER | Age: 62
End: 2022-04-29
Attending: UROLOGY
Payer: COMMERCIAL

## 2022-04-29 LAB — PSA SERPL-MCNC: 1.47 NG/ML (ref 0–4)

## 2022-04-29 PROCEDURE — 84153 ASSAY OF PSA TOTAL: CPT

## 2022-04-29 PROCEDURE — 36415 COLL VENOUS BLD VENIPUNCTURE: CPT

## 2022-08-29 SDOH — ECONOMIC STABILITY: HOUSING INSECURITY: IN THE LAST 12 MONTHS, HOW MANY PLACES HAVE YOU LIVED?: 1

## 2022-08-29 SDOH — HEALTH STABILITY: PHYSICAL HEALTH: ON AVERAGE, HOW MANY MINUTES DO YOU ENGAGE IN EXERCISE AT THIS LEVEL?: 60 MIN

## 2022-08-29 SDOH — ECONOMIC STABILITY: TRANSPORTATION INSECURITY

## 2022-08-29 SDOH — ECONOMIC STABILITY: INCOME INSECURITY: IN THE LAST 12 MONTHS, WAS THERE A TIME WHEN YOU WERE NOT ABLE TO PAY THE MORTGAGE OR RENT ON TIME?: NO

## 2022-08-29 SDOH — ECONOMIC STABILITY: HOUSING INSECURITY
IN THE LAST 12 MONTHS, WAS THERE A TIME WHEN YOU DID NOT HAVE A STEADY PLACE TO SLEEP OR SLEPT IN A SHELTER (INCLUDING NOW)?: NO

## 2022-08-29 SDOH — HEALTH STABILITY: PHYSICAL HEALTH: ON AVERAGE, HOW MANY DAYS PER WEEK DO YOU ENGAGE IN MODERATE TO STRENUOUS EXERCISE (LIKE A BRISK WALK)?: 7 DAYS

## 2022-08-29 SDOH — ECONOMIC STABILITY: FOOD INSECURITY: WITHIN THE PAST 12 MONTHS, YOU WORRIED THAT YOUR FOOD WOULD RUN OUT BEFORE YOU GOT MONEY TO BUY MORE.: NEVER TRUE

## 2022-08-29 SDOH — ECONOMIC STABILITY: FOOD INSECURITY: WITHIN THE PAST 12 MONTHS, THE FOOD YOU BOUGHT JUST DIDN'T LAST AND YOU DIDN'T HAVE MONEY TO GET MORE.: NEVER TRUE

## 2022-08-29 SDOH — ECONOMIC STABILITY: INCOME INSECURITY: HOW HARD IS IT FOR YOU TO PAY FOR THE VERY BASICS LIKE FOOD, HOUSING, MEDICAL CARE, AND HEATING?: NOT HARD AT ALL

## 2022-08-29 SDOH — ECONOMIC STABILITY: TRANSPORTATION INSECURITY
IN THE PAST 12 MONTHS, HAS LACK OF RELIABLE TRANSPORTATION KEPT YOU FROM MEDICAL APPOINTMENTS, MEETINGS, WORK OR FROM GETTING THINGS NEEDED FOR DAILY LIVING?: NO

## 2022-08-29 SDOH — ECONOMIC STABILITY: TRANSPORTATION INSECURITY
IN THE PAST 12 MONTHS, HAS LACK OF TRANSPORTATION KEPT YOU FROM MEETINGS, WORK, OR FROM GETTING THINGS NEEDED FOR DAILY LIVING?: NO

## 2022-08-29 SDOH — HEALTH STABILITY: MENTAL HEALTH
STRESS IS WHEN SOMEONE FEELS TENSE, NERVOUS, ANXIOUS, OR CAN'T SLEEP AT NIGHT BECAUSE THEIR MIND IS TROUBLED. HOW STRESSED ARE YOU?: NOT AT ALL

## 2022-08-29 ASSESSMENT — SOCIAL DETERMINANTS OF HEALTH (SDOH)
HOW OFTEN DO YOU ATTEND CHURCH OR RELIGIOUS SERVICES?: 1 TO 4 TIMES PER YEAR
DO YOU BELONG TO ANY CLUBS OR ORGANIZATIONS SUCH AS CHURCH GROUPS UNIONS, FRATERNAL OR ATHLETIC GROUPS, OR SCHOOL GROUPS?: NO
IN A TYPICAL WEEK, HOW MANY TIMES DO YOU TALK ON THE PHONE WITH FAMILY, FRIENDS, OR NEIGHBORS?: MORE THAN THREE TIMES A WEEK
HOW OFTEN DO YOU GET TOGETHER WITH FRIENDS OR RELATIVES?: TWICE A WEEK
WITHIN THE PAST 12 MONTHS, YOU WORRIED THAT YOUR FOOD WOULD RUN OUT BEFORE YOU GOT THE MONEY TO BUY MORE: NEVER TRUE
HOW OFTEN DO YOU ATTENT MEETINGS OF THE CLUB OR ORGANIZATION YOU BELONG TO?: NEVER
DO YOU BELONG TO ANY CLUBS OR ORGANIZATIONS SUCH AS CHURCH GROUPS UNIONS, FRATERNAL OR ATHLETIC GROUPS, OR SCHOOL GROUPS?: NO
HOW OFTEN DO YOU HAVE A DRINK CONTAINING ALCOHOL: NEVER
IN A TYPICAL WEEK, HOW MANY TIMES DO YOU TALK ON THE PHONE WITH FAMILY, FRIENDS, OR NEIGHBORS?: MORE THAN THREE TIMES A WEEK
HOW OFTEN DO YOU ATTEND CHURCH OR RELIGIOUS SERVICES?: 1 TO 4 TIMES PER YEAR
HOW OFTEN DO YOU ATTENT MEETINGS OF THE CLUB OR ORGANIZATION YOU BELONG TO?: NEVER
HOW OFTEN DO YOU GET TOGETHER WITH FRIENDS OR RELATIVES?: TWICE A WEEK
HOW OFTEN DO YOU ATTEND CHURCH OR RELIGIOUS SERVICES?: 1 TO 4 TIMES PER YEAR
HOW HARD IS IT FOR YOU TO PAY FOR THE VERY BASICS LIKE FOOD, HOUSING, MEDICAL CARE, AND HEATING?: NOT HARD AT ALL
HOW OFTEN DO YOU HAVE SIX OR MORE DRINKS ON ONE OCCASION: NEVER
HOW OFTEN DO YOU GET TOGETHER WITH FRIENDS OR RELATIVES?: TWICE A WEEK
HOW MANY DRINKS CONTAINING ALCOHOL DO YOU HAVE ON A TYPICAL DAY WHEN YOU ARE DRINKING: PATIENT DOES NOT DRINK
HOW OFTEN DO YOU ATTENT MEETINGS OF THE CLUB OR ORGANIZATION YOU BELONG TO?: NEVER
DO YOU BELONG TO ANY CLUBS OR ORGANIZATIONS SUCH AS CHURCH GROUPS UNIONS, FRATERNAL OR ATHLETIC GROUPS, OR SCHOOL GROUPS?: NO
IN A TYPICAL WEEK, HOW MANY TIMES DO YOU TALK ON THE PHONE WITH FAMILY, FRIENDS, OR NEIGHBORS?: MORE THAN THREE TIMES A WEEK

## 2022-08-29 ASSESSMENT — LIFESTYLE VARIABLES
AUDIT-C TOTAL SCORE: 0
AUDIT-C TOTAL SCORE: 0
HOW MANY STANDARD DRINKS CONTAINING ALCOHOL DO YOU HAVE ON A TYPICAL DAY: PATIENT DOES NOT DRINK
HOW OFTEN DO YOU HAVE A DRINK CONTAINING ALCOHOL: NEVER
HOW OFTEN DO YOU HAVE A DRINK CONTAINING ALCOHOL: NEVER
SKIP TO QUESTIONS 9-10: 1
HOW OFTEN DO YOU HAVE SIX OR MORE DRINKS ON ONE OCCASION: NEVER
HOW MANY STANDARD DRINKS CONTAINING ALCOHOL DO YOU HAVE ON A TYPICAL DAY: PATIENT DOES NOT DRINK
HOW OFTEN DO YOU HAVE SIX OR MORE DRINKS ON ONE OCCASION: NEVER
SKIP TO QUESTIONS 9-10: 1

## 2022-09-01 ENCOUNTER — OFFICE VISIT (OUTPATIENT)
Dept: MEDICAL GROUP | Facility: PHYSICIAN GROUP | Age: 62
End: 2022-09-01
Payer: COMMERCIAL

## 2022-09-01 VITALS
DIASTOLIC BLOOD PRESSURE: 72 MMHG | HEIGHT: 70 IN | OXYGEN SATURATION: 98 % | TEMPERATURE: 98.5 F | HEART RATE: 60 BPM | WEIGHT: 154 LBS | BODY MASS INDEX: 22.05 KG/M2 | RESPIRATION RATE: 16 BRPM | SYSTOLIC BLOOD PRESSURE: 130 MMHG

## 2022-09-01 DIAGNOSIS — F12.90 MARIJUANA USE: ICD-10-CM

## 2022-09-01 DIAGNOSIS — Z23 NEED FOR VACCINATION: ICD-10-CM

## 2022-09-01 DIAGNOSIS — R73.03 PREDIABETES: ICD-10-CM

## 2022-09-01 DIAGNOSIS — Z00.00 WELL ADULT EXAM: ICD-10-CM

## 2022-09-01 DIAGNOSIS — E78.5 DYSLIPIDEMIA: ICD-10-CM

## 2022-09-01 PROBLEM — F17.200 NICOTINE DEPENDENCE: Status: ACTIVE | Noted: 2022-09-01

## 2022-09-01 PROBLEM — R39.198 DECREASED URINE STREAM: Status: RESOLVED | Noted: 2021-08-04 | Resolved: 2022-09-01

## 2022-09-01 PROCEDURE — 90677 PCV20 VACCINE IM: CPT | Performed by: INTERNAL MEDICINE

## 2022-09-01 PROCEDURE — 99204 OFFICE O/P NEW MOD 45 MIN: CPT | Mod: 25 | Performed by: INTERNAL MEDICINE

## 2022-09-01 PROCEDURE — 90471 IMMUNIZATION ADMIN: CPT | Performed by: INTERNAL MEDICINE

## 2022-09-01 RX ORDER — ATORVASTATIN CALCIUM 20 MG/1
20 TABLET, FILM COATED ORAL EVERY EVENING
Qty: 90 TABLET | Refills: 3 | Status: SHIPPED | OUTPATIENT
Start: 2022-09-01 | End: 2023-04-11 | Stop reason: SDUPTHER

## 2022-09-01 ASSESSMENT — FIBROSIS 4 INDEX: FIB4 SCORE: 1.64

## 2022-09-01 NOTE — PROGRESS NOTES
"PRIMARY CARE CLINIC VISIT  Chief Complaint   Patient presents with    New Patient     [facility]  Prescription refill    History of Present Illness     Dyslipidemia  This is a chronic condition.  The patient is presently taking Lipitor.  Lab tests ordered for follow-up.  No significant side effects reported.    Marijuana use  Chronic condition.  Advised the patient to discontinue.    Prediabetes  This is a chronic condition.  The patient on diet therapy.  Lab tests ordered for follow-up.    Current Outpatient Medications on File Prior to Visit   Medication Sig Dispense Refill    multivitamin (THERAGRAN) Tab Take 1 Tablet by mouth every day.      Omega-3 Fatty Acids (FISH OIL) 1000 MG Cap capsule Take 1,000 mg by mouth every day.      Cholecalciferol (D3 ADULT PO) Take 1 Tablet by mouth every day.      Ascorbic Acid (VITAMIN C) 100 MG Chew Tab Chew 1 Tablet every day. With Zinc        No current facility-administered medications on file prior to visit.        Allergies: Patient has no known allergies.    ROS  As per HPI above. All other systems reviewed and negative.      Past Medical, Social, and Family history reviewed and updated in EPIC     Objective     /72 (BP Location: Left arm, Patient Position: Sitting, BP Cuff Size: Adult)   Pulse 60   Temp 36.9 °C (98.5 °F) (Temporal)   Resp 16   Ht 1.778 m (5' 10\")   Wt 69.9 kg (154 lb)   SpO2 98%    Body mass index is 22.1 kg/m².    General: alert in no apparent distress.  Cardiovascular: regular rate and rhythm  Pulmonary: lungs : no wheezing   Gastrointestinal: BS present. No obvious mass noted          Assessment and Plan     1. Dyslipidemia  - atorvastatin (LIPITOR) 20 MG Tab; Take 1 Tablet by mouth every evening.  Dispense: 90 Tablet; Refill: 3  - ALANINE AMINO-TRANS; Future  - Lipid Profile; Future  Chronic condition.  Advised the patient to continue atorvastatin.  Lab tests ordered for follow-up.  2. Marijuana use  Advised the patient to " discontinue  3. Need for vaccination  - Pneumococcal Conjugate Vaccine 20-Valent (19 yrs+)    4. Well adult exam  - HEMOGLOBIN A1C; Future  - Basic Metabolic Panel; Future  - PROSTATE SPECIFIC AG SCREENING; Future  - TSH; Future  - MICROALBUMIN CREAT RATIO URINE; Future  Routine lab work ordered.  Advised the patient to follow-up after lab test done  5. Prediabetes    Recommend healthy diet and exercise.                        Please note that this dictation was created using voice recognition software. I have made every reasonable attempt to correct obvious errors, but I expect that there are errors of grammar and possibly content that I did not discover before finalizing the note.    Tomas Puckett MD  Internal Medicine  [facility]

## 2022-09-01 NOTE — ASSESSMENT & PLAN NOTE
This is a chronic condition.  The patient is presently taking Lipitor.  Lab tests ordered for follow-up.  No significant side effects reported.

## 2022-09-12 ENCOUNTER — HOSPITAL ENCOUNTER (OUTPATIENT)
Dept: LAB | Facility: MEDICAL CENTER | Age: 62
End: 2022-09-12
Attending: INTERNAL MEDICINE
Payer: COMMERCIAL

## 2022-09-12 DIAGNOSIS — Z00.00 WELL ADULT EXAM: ICD-10-CM

## 2022-09-12 DIAGNOSIS — E78.5 DYSLIPIDEMIA: ICD-10-CM

## 2022-09-12 LAB
ALT SERPL-CCNC: 19 U/L (ref 2–50)
ANION GAP SERPL CALC-SCNC: 9 MMOL/L (ref 7–16)
BUN SERPL-MCNC: 17 MG/DL (ref 8–22)
CALCIUM SERPL-MCNC: 9.4 MG/DL (ref 8.5–10.5)
CHLORIDE SERPL-SCNC: 105 MMOL/L (ref 96–112)
CHOLEST SERPL-MCNC: 144 MG/DL (ref 100–199)
CO2 SERPL-SCNC: 25 MMOL/L (ref 20–33)
CREAT SERPL-MCNC: 0.85 MG/DL (ref 0.5–1.4)
CREAT UR-MCNC: 159.55 MG/DL
EST. AVERAGE GLUCOSE BLD GHB EST-MCNC: 120 MG/DL
FASTING STATUS PATIENT QL REPORTED: NORMAL
GFR SERPLBLD CREATININE-BSD FMLA CKD-EPI: 97 ML/MIN/1.73 M 2
GLUCOSE SERPL-MCNC: 111 MG/DL (ref 65–99)
HBA1C MFR BLD: 5.8 % (ref 4–5.6)
HDLC SERPL-MCNC: 50 MG/DL
LDLC SERPL CALC-MCNC: 74 MG/DL
MICROALBUMIN UR-MCNC: 1.3 MG/DL
MICROALBUMIN/CREAT UR: 8 MG/G (ref 0–30)
POTASSIUM SERPL-SCNC: 4.1 MMOL/L (ref 3.6–5.5)
PSA SERPL-MCNC: 1.9 NG/ML (ref 0–4)
SODIUM SERPL-SCNC: 139 MMOL/L (ref 135–145)
TRIGL SERPL-MCNC: 100 MG/DL (ref 0–149)
TSH SERPL DL<=0.005 MIU/L-ACNC: 1.97 UIU/ML (ref 0.38–5.33)

## 2022-09-12 PROCEDURE — 84443 ASSAY THYROID STIM HORMONE: CPT

## 2022-09-12 PROCEDURE — 83036 HEMOGLOBIN GLYCOSYLATED A1C: CPT

## 2022-09-12 PROCEDURE — 36415 COLL VENOUS BLD VENIPUNCTURE: CPT

## 2022-09-12 PROCEDURE — 82043 UR ALBUMIN QUANTITATIVE: CPT

## 2022-09-12 PROCEDURE — 84460 ALANINE AMINO (ALT) (SGPT): CPT

## 2022-09-12 PROCEDURE — 82570 ASSAY OF URINE CREATININE: CPT

## 2022-09-12 PROCEDURE — 80048 BASIC METABOLIC PNL TOTAL CA: CPT

## 2022-09-12 PROCEDURE — 80061 LIPID PANEL: CPT

## 2022-09-12 PROCEDURE — 84153 ASSAY OF PSA TOTAL: CPT

## 2022-10-13 ENCOUNTER — OFFICE VISIT (OUTPATIENT)
Dept: MEDICAL GROUP | Facility: PHYSICIAN GROUP | Age: 62
End: 2022-10-13
Payer: COMMERCIAL

## 2022-10-13 VITALS
TEMPERATURE: 98.6 F | OXYGEN SATURATION: 99 % | SYSTOLIC BLOOD PRESSURE: 130 MMHG | WEIGHT: 153 LBS | HEIGHT: 70 IN | BODY MASS INDEX: 21.9 KG/M2 | RESPIRATION RATE: 16 BRPM | HEART RATE: 61 BPM | DIASTOLIC BLOOD PRESSURE: 72 MMHG

## 2022-10-13 DIAGNOSIS — M65.342 TRIGGER RING FINGER OF LEFT HAND: ICD-10-CM

## 2022-10-13 DIAGNOSIS — Z23 NEED FOR VACCINATION: ICD-10-CM

## 2022-10-13 DIAGNOSIS — M65.341 TRIGGER RING FINGER OF RIGHT HAND: ICD-10-CM

## 2022-10-13 DIAGNOSIS — E78.5 DYSLIPIDEMIA: ICD-10-CM

## 2022-10-13 DIAGNOSIS — R73.03 PREDIABETES: ICD-10-CM

## 2022-10-13 PROCEDURE — 90746 HEPB VACCINE 3 DOSE ADULT IM: CPT | Performed by: INTERNAL MEDICINE

## 2022-10-13 PROCEDURE — 90686 IIV4 VACC NO PRSV 0.5 ML IM: CPT | Performed by: INTERNAL MEDICINE

## 2022-10-13 PROCEDURE — 90471 IMMUNIZATION ADMIN: CPT | Performed by: INTERNAL MEDICINE

## 2022-10-13 PROCEDURE — 90472 IMMUNIZATION ADMIN EACH ADD: CPT | Performed by: INTERNAL MEDICINE

## 2022-10-13 PROCEDURE — 99214 OFFICE O/P EST MOD 30 MIN: CPT | Performed by: INTERNAL MEDICINE

## 2022-10-13 ASSESSMENT — FIBROSIS 4 INDEX: FIB4 SCORE: 2.12

## 2022-10-13 NOTE — ASSESSMENT & PLAN NOTE
Recent blood test show mildly elevated A1c.  Advised the patient the result.  Recommended diet and exercise.

## 2022-10-13 NOTE — ASSESSMENT & PLAN NOTE
Chronic condition affecting the right fourth finger.  Patient reported recurrent severe pain specially in the morning.  He denies recent trauma or injury.

## 2022-10-13 NOTE — PROGRESS NOTES
"PRIMARY CARE CLINIC VISIT  Chief Complaint   Patient presents with    Follow-Up     Painful left fourth finger  Follow-up cholesterol condition    History of Present Illness     Dyslipidemia  This is a chronic and stable condition.  The patient currently taking atorvastatin.  Recent lipid panel result discussed with the patient.    Prediabetes  Recent blood test show mildly elevated A1c.  Advised the patient the result.  Recommended diet and exercise.    Trigger ring finger of L hand  Chronic condition affecting the right fourth finger.  Patient reported recurrent severe pain specially in the morning.  He denies recent trauma or injury.    Current Outpatient Medications on File Prior to Visit   Medication Sig Dispense Refill    atorvastatin (LIPITOR) 20 MG Tab Take 1 Tablet by mouth every evening. 90 Tablet 3    multivitamin (THERAGRAN) Tab Take 1 Tablet by mouth every day.      Omega-3 Fatty Acids (FISH OIL) 1000 MG Cap capsule Take 1,000 mg by mouth every day.      Cholecalciferol (D3 ADULT PO) Take 1 Tablet by mouth every day.      Ascorbic Acid (VITAMIN C) 100 MG Chew Tab Chew 1 Tablet every day. With Zinc        No current facility-administered medications on file prior to visit.        Allergies: Patient has no known allergies.    ROS  As per HPI above. All other systems reviewed and negative.      Past Medical, Social, and Family history reviewed and updated in EPIC     Objective     /72 (BP Location: Left arm, Patient Position: Sitting, BP Cuff Size: Adult)   Pulse 61   Temp 37 °C (98.6 °F) (Temporal)   Resp 16   Ht 1.778 m (5' 10\")   Wt 69.4 kg (153 lb)   SpO2 99%    Body mass index is 21.95 kg/m².    General: alert in no apparent distress.  Cardiovascular: regular rate and rhythm  Pulmonary: lungs : no wheezing   Gastrointestinal: BS present. No obvious mass noted  L hand examination there is no significant swelling redness or deformity.  Left fourth finger with evidence of trigger finger     " Latest Reference Range & Units 9/12/22 06:05   Sodium 135 - 145 mmol/L 139   Potassium 3.6 - 5.5 mmol/L 4.1   Chloride 96 - 112 mmol/L 105   Co2 20 - 33 mmol/L 25   Anion Gap 7.0 - 16.0  9.0   Glucose 65 - 99 mg/dL 111 (H)   Bun 8 - 22 mg/dL 17   Creatinine 0.50 - 1.40 mg/dL 0.85   GFR (CKD-EPI) >60 mL/min/1.73 m 2 97   Calcium 8.5 - 10.5 mg/dL 9.4   ALT(SGPT) 2 - 50 U/L 19   Glycohemoglobin 4.0 - 5.6 % 5.8 (H)   Estim. Avg Glu mg/dL 120   Fasting Status  Fasting   Cholesterol,Tot 100 - 199 mg/dL 144   Triglycerides 0 - 149 mg/dL 100   HDL >=40 mg/dL 50   LDL <100 mg/dL 74   Micro Alb Creat Ratio 0 - 30 mg/g 8   Creatinine, Urine mg/dL 159.55   Microalbumin, Urine Random mg/dL 1.3   (H): Data is abnormally high      Assessment and Plan     1. Need for vaccination  - Hepatitis B Vaccine Adult 20+  - INFLUENZA VACCINE QUAD INJ (PF)    2. Trigger ring finger of Left hand  - Referral to Hand Surgery  - DX-FINGER(S)  left.    3. Dyslipidemia  Chronic stable condition.  Continue atorvastatin.  4. Prediabetes  Advised the patient regarding healthy diet and exercise.  Recommend to continue to monitor.    Follow-up again in about 4 to 6 months with lab test prior                      Please note that this dictation was created using voice recognition software. I have made every reasonable attempt to correct obvious errors, but I expect that there are errors of grammar and possibly content that I did not discover before finalizing the note.    Tomas Puckett MD  Internal Medicine  [facility]

## 2022-10-13 NOTE — ASSESSMENT & PLAN NOTE
This is a chronic and stable condition.  The patient currently taking atorvastatin.  Recent lipid panel result discussed with the patient.

## 2022-10-18 ENCOUNTER — HOSPITAL ENCOUNTER (OUTPATIENT)
Dept: RADIOLOGY | Facility: MEDICAL CENTER | Age: 62
End: 2022-10-18
Attending: INTERNAL MEDICINE
Payer: COMMERCIAL

## 2022-10-18 DIAGNOSIS — M65.342 TRIGGER RING FINGER OF LEFT HAND: ICD-10-CM

## 2022-10-18 PROCEDURE — 73140 X-RAY EXAM OF FINGER(S): CPT | Mod: LT

## 2022-11-14 ENCOUNTER — NON-PROVIDER VISIT (OUTPATIENT)
Dept: MEDICAL GROUP | Facility: PHYSICIAN GROUP | Age: 62
End: 2022-11-14
Payer: COMMERCIAL

## 2022-11-14 DIAGNOSIS — Z23 NEED FOR VACCINATION: ICD-10-CM

## 2022-11-14 PROCEDURE — 90746 HEPB VACCINE 3 DOSE ADULT IM: CPT | Performed by: INTERNAL MEDICINE

## 2022-11-14 PROCEDURE — 90471 IMMUNIZATION ADMIN: CPT | Performed by: INTERNAL MEDICINE

## 2022-11-14 NOTE — PROGRESS NOTES
"Leander Saxena is a 62 y.o. male here for a non-provider visit for:   HEPATITIS B 2 of 3    Reason for immunization: continue or complete series started at the office  Immunization records indicate need for vaccine: Yes, confirmed with Epic  Minimum interval has been met for this vaccine: Yes  ABN completed: Yes    VIS Dated  10/15/2021 was given to patient: Yes  All IAC Questionnaire questions were answered \"No.\"    Patient tolerated injection and no adverse effects were observed or reported: Yes    Pt scheduled for next dose in series: Yes    "

## 2023-04-07 ENCOUNTER — HOSPITAL ENCOUNTER (OUTPATIENT)
Dept: LAB | Facility: MEDICAL CENTER | Age: 63
End: 2023-04-07
Attending: INTERNAL MEDICINE
Payer: COMMERCIAL

## 2023-04-07 DIAGNOSIS — R73.03 PREDIABETES: ICD-10-CM

## 2023-04-07 DIAGNOSIS — E78.5 DYSLIPIDEMIA: ICD-10-CM

## 2023-04-07 LAB
EST. AVERAGE GLUCOSE BLD GHB EST-MCNC: 123 MG/DL
HBA1C MFR BLD: 5.9 % (ref 4–5.6)

## 2023-04-07 PROCEDURE — 80061 LIPID PANEL: CPT

## 2023-04-07 PROCEDURE — 83036 HEMOGLOBIN GLYCOSYLATED A1C: CPT

## 2023-04-07 PROCEDURE — 36415 COLL VENOUS BLD VENIPUNCTURE: CPT

## 2023-04-07 PROCEDURE — 80048 BASIC METABOLIC PNL TOTAL CA: CPT

## 2023-04-08 LAB
ANION GAP SERPL CALC-SCNC: 11 MMOL/L (ref 7–16)
BUN SERPL-MCNC: 16 MG/DL (ref 8–22)
CALCIUM SERPL-MCNC: 9.4 MG/DL (ref 8.5–10.5)
CHLORIDE SERPL-SCNC: 109 MMOL/L (ref 96–112)
CHOLEST SERPL-MCNC: 163 MG/DL (ref 100–199)
CO2 SERPL-SCNC: 26 MMOL/L (ref 20–33)
CREAT SERPL-MCNC: 0.77 MG/DL (ref 0.5–1.4)
FASTING STATUS PATIENT QL REPORTED: NORMAL
GFR SERPLBLD CREATININE-BSD FMLA CKD-EPI: 100 ML/MIN/1.73 M 2
GLUCOSE SERPL-MCNC: 92 MG/DL (ref 65–99)
HDLC SERPL-MCNC: 52 MG/DL
LDLC SERPL CALC-MCNC: 88 MG/DL
POTASSIUM SERPL-SCNC: 4.7 MMOL/L (ref 3.6–5.5)
SODIUM SERPL-SCNC: 146 MMOL/L (ref 135–145)
TRIGL SERPL-MCNC: 117 MG/DL (ref 0–149)

## 2023-04-11 ENCOUNTER — OFFICE VISIT (OUTPATIENT)
Dept: MEDICAL GROUP | Facility: PHYSICIAN GROUP | Age: 63
End: 2023-04-11
Payer: COMMERCIAL

## 2023-04-11 VITALS
TEMPERATURE: 97.9 F | DIASTOLIC BLOOD PRESSURE: 72 MMHG | RESPIRATION RATE: 16 BRPM | HEIGHT: 70 IN | SYSTOLIC BLOOD PRESSURE: 130 MMHG | BODY MASS INDEX: 22.36 KG/M2 | HEART RATE: 64 BPM | OXYGEN SATURATION: 97 % | WEIGHT: 156.2 LBS

## 2023-04-11 DIAGNOSIS — F12.90 MARIJUANA USE: ICD-10-CM

## 2023-04-11 DIAGNOSIS — Z12.11 COLON CANCER SCREENING: ICD-10-CM

## 2023-04-11 DIAGNOSIS — Z00.00 WELL ADULT EXAM: ICD-10-CM

## 2023-04-11 DIAGNOSIS — R73.03 PREDIABETES: Chronic | ICD-10-CM

## 2023-04-11 DIAGNOSIS — E78.5 DYSLIPIDEMIA: ICD-10-CM

## 2023-04-11 DIAGNOSIS — M65.342 TRIGGER RING FINGER OF LEFT HAND: ICD-10-CM

## 2023-04-11 PROCEDURE — 99214 OFFICE O/P EST MOD 30 MIN: CPT | Performed by: INTERNAL MEDICINE

## 2023-04-11 RX ORDER — ATORVASTATIN CALCIUM 20 MG/1
20 TABLET, FILM COATED ORAL EVERY EVENING
Qty: 90 TABLET | Refills: 3 | Status: SHIPPED | OUTPATIENT
Start: 2023-04-11

## 2023-04-11 ASSESSMENT — FIBROSIS 4 INDEX: FIB4 SCORE: 2.16

## 2023-04-11 ASSESSMENT — PATIENT HEALTH QUESTIONNAIRE - PHQ9: CLINICAL INTERPRETATION OF PHQ2 SCORE: 0

## 2023-04-11 NOTE — ASSESSMENT & PLAN NOTE
Chronic condition.  Patient presently on diet therapy.  Recent lab test result discussed with the patient.

## 2023-04-11 NOTE — PROGRESS NOTES
PRIMARY CARE CLINIC VISIT    Chief complaint:    Follow-up hyperlipidemia  Follow-up prediabetes  Lab test results  Prescription refill was  Request colonoscopy    History of Present Illness     Prediabetes  Chronic condition.  Patient presently on diet therapy.  Recent lab test result discussed with the patient.    Dyslipidemia  Chronic condition.  The patient is presently taking atorvastatin 20 Mg daily.  The patient tolerating medication well.  No significant side effects reported.    Marijuana use  Chronic condition.  Strongly advised the patient to discontinue marijuana use    Trigger ring finger of left hand  This is a chronic condition.  The patient has had injection done which lasted for approximately 2 months.  The patient reported that symptoms recur.  He is tolerating his symptoms well.  The patient declined to be referred to hand surgery.    Current Outpatient Medications on File Prior to Visit   Medication Sig Dispense Refill    silodosin (RAPAFLO) 8 MG Cap capsule silodosin 8 mg capsule   TAKE ONE CAPSULE BY MOUTH ONE TIME DAILY      tamsulosin (FLOMAX) 0.4 MG capsule tamsulosin 0.4 mg capsule   TAKE ONE CAPSULE BY MOUTH NIGHTLY AT BEDTIME      Omega-3 Fatty Acids (FISH OIL) 1000 MG Cap capsule Take 1,000 mg by mouth every day.      Cholecalciferol (D3 ADULT PO) Take 1 Tablet by mouth every day.      multivitamin (THERAGRAN) Tab Take 1 Tablet by mouth every day.      Ascorbic Acid (VITAMIN C) 100 MG Chew Tab Chew 1 Tablet every day. With Zinc        No current facility-administered medications on file prior to visit.        Allergies: Patient has no known allergies.    Current Outpatient Medications Ordered in Epic   Medication Sig Dispense Refill    atorvastatin (LIPITOR) 20 MG Tab Take 1 Tablet by mouth every evening. 90 Tablet 3    silodosin (RAPAFLO) 8 MG Cap capsule silodosin 8 mg capsule   TAKE ONE CAPSULE BY MOUTH ONE TIME DAILY      tamsulosin (FLOMAX) 0.4 MG capsule tamsulosin 0.4 mg capsule    "TAKE ONE CAPSULE BY MOUTH NIGHTLY AT BEDTIME      Omega-3 Fatty Acids (FISH OIL) 1000 MG Cap capsule Take 1,000 mg by mouth every day.      Cholecalciferol (D3 ADULT PO) Take 1 Tablet by mouth every day.      multivitamin (THERAGRAN) Tab Take 1 Tablet by mouth every day.      Ascorbic Acid (VITAMIN C) 100 MG Chew Tab Chew 1 Tablet every day. With Zinc        No current Epic-ordered facility-administered medications on file.       Past Medical History:   Diagnosis Date    Dyslipidemia     High cholesterol     Impaired fasting blood sugar        Past Surgical History:   Procedure Laterality Date    TURBT (TRANSURETHRAL RESECTION OF BLADDER TUMOR)  9/3/2021    Procedure: TURBT (TRANSURETHRAL RESECTION OF BLADDER TUMOR) - MONOPOLAR;  Surgeon: Cristino Muñoz M.D.;  Location: SURGERY [facility];  Service: Urology    COLONOSCOPY WITH POLYP  6/19/13    asc colon polyp-adenomatous, mild diverticulosis asc colon       Family History   Problem Relation Age of Onset    Diabetes Mother     Hypertension Mother     Hyperlipidemia Mother     Heart Attack Father         age 60s    Hypertension Father     Hyperlipidemia Father     Diabetes Sister     Diabetes Brother     Heart Attack Brother         age 60    Diabetes Brother     No Known Problems Sister        Social History     Tobacco Use   Smoking Status Former    Packs/day: 0.30    Years: 30.00    Pack years: 9.00    Types: Cigarettes   Smokeless Tobacco Never       Social History     Substance and Sexual Activity   Alcohol Use Yes    Alcohol/week: 1.2 oz    Types: 1 Cans of beer, 1 Shots of liquor per week    Comment: rui       Review of systems.  As per HPI above. All other systems reviewed and negative.      Past Medical, Social, and Family history reviewed and updated in EPIC     Objective     /72 (BP Location: Left arm, Patient Position: Sitting, BP Cuff Size: Adult)   Pulse 64   Temp 36.6 °C (97.9 °F) (Temporal)   Resp 16   Ht 1.778 m (5' 10\")   Wt 70.9 kg " (156 lb 3.2 oz)   SpO2 97%    Body mass index is 22.41 kg/m².    General: alert in no apparent distress.  Cardiovascular: regular rate and rhythm  Pulmonary: lungs : no wheezing   Gastrointestinal: BS present. No obvious mass noted  Left hand no significant swelling redness or deformity.  Trigger finger noted] left ring finger].  Handgrip 5/5 range of motion of the fingers full.    Lab Results   Component Value Date/Time    HBA1C 5.9 (H) 04/07/2023 06:19 AM    HBA1C 5.8 (H) 09/12/2022 06:05 AM    HBA1C 6.0 (H) 02/02/2022 06:13 AM       Lab Results   Component Value Date/Time    WBC 5.6 02/02/2022 06:13 AM    HEMOGLOBIN 15.2 02/02/2022 06:13 AM    HEMATOCRIT 46.5 02/02/2022 06:13 AM    MCV 84.9 02/02/2022 06:13 AM    PLATELETCT 252 02/02/2022 06:13 AM         Lab Results   Component Value Date/Time    SODIUM 146 (H) 04/07/2023 06:19 AM    POTASSIUM 4.7 04/07/2023 06:19 AM    GLUCOSE 92 04/07/2023 06:19 AM    BUN 16 04/07/2023 06:19 AM    CREATININE 0.77 04/07/2023 06:19 AM       Lab Results   Component Value Date/Time    CHOLSTRLTOT 163 04/07/2023 06:19 AM    LDL 88 04/07/2023 06:19 AM    HDL 52 04/07/2023 06:19 AM    TRIGLYCERIDE 117 04/07/2023 06:19 AM       Lab Results   Component Value Date/Time    ALTSGPT 19 09/12/2022 06:05 AM             Assessment and Plan     1. Dyslipidemia  - atorvastatin (LIPITOR) 20 MG Tab; Take 1 Tablet by mouth every evening.  Dispense: 90 Tablet; Refill: 3  - ALANINE AMINO-TRANS; Future  - Lipid Profile; Future  Chronic stable condition.  Continue with atorvastatin.  Refill sent to the pharmacy.  Continue with low-fat low-cholesterol diet.    2. Prediabetes  - HEMOGLOBIN A1C; Future  - Basic Metabolic Panel; Future  Chronic stable condition.  Recommended low sweet low-carb diet.  Continue to monitor.  Lab test next visit.    3. Marijuana use  Chronic condition.  Strongly advised the patient to discontinue using marijuana..    4. Trigger ring finger of left hand  Chronic stable  condition.  Patient has had injection done previously.  This time the patient declined to go back to see the hand specialist.  Continue to monitor.    5. Colon cancer screening  - Referral to Gastroenterology        Follow-up 6 months with labs prior              Healthcare Maintenance       Health Maintenance Due   Topic Date Due    IMM HEP B VACCINE (3 of 3 - 19+ 3-dose series) 04/14/2023               Please note that this dictation was created using voice recognition software. I have made every reasonable attempt to correct obvious errors, but I expect that there are errors of grammar and possibly content that I did not discover before finalizing the note.    Tomas Puckett MD  Internal Medicine  [facility]

## 2023-04-11 NOTE — ASSESSMENT & PLAN NOTE
This is a chronic condition.  The patient has had injection done which lasted for approximately 2 months.  The patient reported that symptoms recur.  He is tolerating his symptoms well.  The patient declined to be referred to hand surgery.

## 2023-04-11 NOTE — ASSESSMENT & PLAN NOTE
Chronic condition.  The patient is presently taking atorvastatin 20 Mg daily.  The patient tolerating medication well.  No significant side effects reported.

## 2023-05-18 ENCOUNTER — NON-PROVIDER VISIT (OUTPATIENT)
Dept: MEDICAL GROUP | Facility: PHYSICIAN GROUP | Age: 63
End: 2023-05-18
Payer: COMMERCIAL

## 2023-05-18 DIAGNOSIS — Z23 NEED FOR VACCINATION: ICD-10-CM

## 2023-05-18 PROCEDURE — 90746 HEPB VACCINE 3 DOSE ADULT IM: CPT | Performed by: NURSE PRACTITIONER

## 2023-05-18 PROCEDURE — 90471 IMMUNIZATION ADMIN: CPT | Performed by: NURSE PRACTITIONER

## 2023-05-18 NOTE — PROGRESS NOTES
"Leander Saxena is a 63 y.o. male here for a non-provider visit for:   HEPATITIS B 3 of 3    Reason for immunization: continue or complete series started at the office  Immunization records indicate need for vaccine: Yes, confirmed with Epic  Minimum interval has been met for this vaccine: Yes  ABN completed: Not Indicated    VIS Dated  10/15/21 was given to patient: Yes  All IAC Questionnaire questions were answered \"No.\"    Patient tolerated injection and no adverse effects were observed or reported: Yes    Pt scheduled for next dose in series: Not Indicated   "

## 2023-06-01 ENCOUNTER — HOSPITAL ENCOUNTER (OUTPATIENT)
Facility: MEDICAL CENTER | Age: 63
End: 2023-06-01
Attending: UROLOGY
Payer: COMMERCIAL

## 2023-06-01 LAB — PSA SERPL-MCNC: 2.79 NG/ML (ref 0–4)

## 2023-06-01 PROCEDURE — 84153 ASSAY OF PSA TOTAL: CPT

## 2023-11-20 ENCOUNTER — HOSPITAL ENCOUNTER (OUTPATIENT)
Dept: LAB | Facility: MEDICAL CENTER | Age: 63
End: 2023-11-20
Attending: INTERNAL MEDICINE
Payer: COMMERCIAL

## 2023-11-20 DIAGNOSIS — R73.03 PREDIABETES: Chronic | ICD-10-CM

## 2023-11-20 DIAGNOSIS — Z00.00 WELL ADULT EXAM: ICD-10-CM

## 2023-11-20 DIAGNOSIS — E78.5 DYSLIPIDEMIA: ICD-10-CM

## 2023-11-20 LAB
25(OH)D3 SERPL-MCNC: 38 NG/ML (ref 30–100)
ALT SERPL-CCNC: 24 U/L (ref 2–50)
ANION GAP SERPL CALC-SCNC: 9 MMOL/L (ref 7–16)
BUN SERPL-MCNC: 15 MG/DL (ref 8–22)
CALCIUM SERPL-MCNC: 9.5 MG/DL (ref 8.5–10.5)
CHLORIDE SERPL-SCNC: 105 MMOL/L (ref 96–112)
CHOLEST SERPL-MCNC: 153 MG/DL (ref 100–199)
CO2 SERPL-SCNC: 28 MMOL/L (ref 20–33)
CREAT SERPL-MCNC: 0.89 MG/DL (ref 0.5–1.4)
CREAT UR-MCNC: 109.63 MG/DL
EST. AVERAGE GLUCOSE BLD GHB EST-MCNC: 134 MG/DL
FASTING STATUS PATIENT QL REPORTED: NORMAL
GFR SERPLBLD CREATININE-BSD FMLA CKD-EPI: 95 ML/MIN/1.73 M 2
GLUCOSE SERPL-MCNC: 120 MG/DL (ref 65–99)
HBA1C MFR BLD: 6.3 % (ref 4–5.6)
HDLC SERPL-MCNC: 48 MG/DL
LDLC SERPL CALC-MCNC: 81 MG/DL
MICROALBUMIN UR-MCNC: 4.2 MG/DL
MICROALBUMIN/CREAT UR: 38 MG/G (ref 0–30)
POTASSIUM SERPL-SCNC: 4.1 MMOL/L (ref 3.6–5.5)
PSA SERPL-MCNC: 3.41 NG/ML (ref 0–4)
SODIUM SERPL-SCNC: 142 MMOL/L (ref 135–145)
TRIGL SERPL-MCNC: 121 MG/DL (ref 0–149)

## 2023-11-20 PROCEDURE — 80061 LIPID PANEL: CPT

## 2023-11-20 PROCEDURE — 80048 BASIC METABOLIC PNL TOTAL CA: CPT

## 2023-11-20 PROCEDURE — 36415 COLL VENOUS BLD VENIPUNCTURE: CPT

## 2023-11-20 PROCEDURE — 83036 HEMOGLOBIN GLYCOSYLATED A1C: CPT

## 2023-11-20 PROCEDURE — 82570 ASSAY OF URINE CREATININE: CPT

## 2023-11-20 PROCEDURE — 82306 VITAMIN D 25 HYDROXY: CPT

## 2023-11-20 PROCEDURE — 82043 UR ALBUMIN QUANTITATIVE: CPT

## 2023-11-20 PROCEDURE — 84460 ALANINE AMINO (ALT) (SGPT): CPT

## 2023-11-20 PROCEDURE — 84153 ASSAY OF PSA TOTAL: CPT

## 2023-11-26 SDOH — HEALTH STABILITY: PHYSICAL HEALTH: ON AVERAGE, HOW MANY MINUTES DO YOU ENGAGE IN EXERCISE AT THIS LEVEL?: 120 MIN

## 2023-11-26 SDOH — ECONOMIC STABILITY: FOOD INSECURITY: WITHIN THE PAST 12 MONTHS, YOU WORRIED THAT YOUR FOOD WOULD RUN OUT BEFORE YOU GOT MONEY TO BUY MORE.: NEVER TRUE

## 2023-11-26 SDOH — ECONOMIC STABILITY: INCOME INSECURITY: IN THE LAST 12 MONTHS, WAS THERE A TIME WHEN YOU WERE NOT ABLE TO PAY THE MORTGAGE OR RENT ON TIME?: NO

## 2023-11-26 SDOH — ECONOMIC STABILITY: FOOD INSECURITY: WITHIN THE PAST 12 MONTHS, THE FOOD YOU BOUGHT JUST DIDN'T LAST AND YOU DIDN'T HAVE MONEY TO GET MORE.: NEVER TRUE

## 2023-11-26 SDOH — ECONOMIC STABILITY: TRANSPORTATION INSECURITY
IN THE PAST 12 MONTHS, HAS THE LACK OF TRANSPORTATION KEPT YOU FROM MEDICAL APPOINTMENTS OR FROM GETTING MEDICATIONS?: NO

## 2023-11-26 SDOH — ECONOMIC STABILITY: HOUSING INSECURITY: IN THE LAST 12 MONTHS, HOW MANY PLACES HAVE YOU LIVED?: 1

## 2023-11-26 SDOH — HEALTH STABILITY: PHYSICAL HEALTH: ON AVERAGE, HOW MANY DAYS PER WEEK DO YOU ENGAGE IN MODERATE TO STRENUOUS EXERCISE (LIKE A BRISK WALK)?: 7 DAYS

## 2023-11-26 SDOH — ECONOMIC STABILITY: INCOME INSECURITY: HOW HARD IS IT FOR YOU TO PAY FOR THE VERY BASICS LIKE FOOD, HOUSING, MEDICAL CARE, AND HEATING?: NOT HARD AT ALL

## 2023-11-26 ASSESSMENT — SOCIAL DETERMINANTS OF HEALTH (SDOH)
HOW OFTEN DO YOU ATTEND CHURCH OR RELIGIOUS SERVICES?: MORE THAN 4 TIMES PER YEAR
HOW OFTEN DO YOU HAVE SIX OR MORE DRINKS ON ONE OCCASION: NEVER
IN A TYPICAL WEEK, HOW MANY TIMES DO YOU TALK ON THE PHONE WITH FAMILY, FRIENDS, OR NEIGHBORS?: MORE THAN THREE TIMES A WEEK
HOW MANY DRINKS CONTAINING ALCOHOL DO YOU HAVE ON A TYPICAL DAY WHEN YOU ARE DRINKING: PATIENT DOES NOT DRINK
HOW HARD IS IT FOR YOU TO PAY FOR THE VERY BASICS LIKE FOOD, HOUSING, MEDICAL CARE, AND HEATING?: NOT HARD AT ALL
WITHIN THE PAST 12 MONTHS, YOU WORRIED THAT YOUR FOOD WOULD RUN OUT BEFORE YOU GOT THE MONEY TO BUY MORE: NEVER TRUE
HOW OFTEN DO YOU ATTEND CHURCH OR RELIGIOUS SERVICES?: MORE THAN 4 TIMES PER YEAR
HOW OFTEN DO YOU GET TOGETHER WITH FRIENDS OR RELATIVES?: PATIENT DECLINED
DO YOU BELONG TO ANY CLUBS OR ORGANIZATIONS SUCH AS CHURCH GROUPS UNIONS, FRATERNAL OR ATHLETIC GROUPS, OR SCHOOL GROUPS?: NO
HOW OFTEN DO YOU GET TOGETHER WITH FRIENDS OR RELATIVES?: PATIENT DECLINED
HOW OFTEN DO YOU ATTENT MEETINGS OF THE CLUB OR ORGANIZATION YOU BELONG TO?: NEVER
HOW OFTEN DO YOU ATTENT MEETINGS OF THE CLUB OR ORGANIZATION YOU BELONG TO?: NEVER
HOW OFTEN DO YOU HAVE A DRINK CONTAINING ALCOHOL: NEVER
IN A TYPICAL WEEK, HOW MANY TIMES DO YOU TALK ON THE PHONE WITH FAMILY, FRIENDS, OR NEIGHBORS?: MORE THAN THREE TIMES A WEEK
DO YOU BELONG TO ANY CLUBS OR ORGANIZATIONS SUCH AS CHURCH GROUPS UNIONS, FRATERNAL OR ATHLETIC GROUPS, OR SCHOOL GROUPS?: NO

## 2023-11-26 ASSESSMENT — LIFESTYLE VARIABLES
HOW OFTEN DO YOU HAVE SIX OR MORE DRINKS ON ONE OCCASION: NEVER
HOW MANY STANDARD DRINKS CONTAINING ALCOHOL DO YOU HAVE ON A TYPICAL DAY: PATIENT DOES NOT DRINK
SKIP TO QUESTIONS 9-10: 1
AUDIT-C TOTAL SCORE: 0
HOW OFTEN DO YOU HAVE A DRINK CONTAINING ALCOHOL: NEVER

## 2023-11-29 ENCOUNTER — OFFICE VISIT (OUTPATIENT)
Dept: MEDICAL GROUP | Facility: PHYSICIAN GROUP | Age: 63
End: 2023-11-29
Payer: COMMERCIAL

## 2023-11-29 VITALS
OXYGEN SATURATION: 97 % | SYSTOLIC BLOOD PRESSURE: 118 MMHG | HEIGHT: 70 IN | DIASTOLIC BLOOD PRESSURE: 78 MMHG | WEIGHT: 154.6 LBS | BODY MASS INDEX: 22.13 KG/M2 | HEART RATE: 69 BPM | TEMPERATURE: 98.9 F

## 2023-11-29 DIAGNOSIS — N40.0 PROSTATE HYPERTROPHY: ICD-10-CM

## 2023-11-29 DIAGNOSIS — E78.5 DYSLIPIDEMIA: ICD-10-CM

## 2023-11-29 DIAGNOSIS — F12.90 MARIJUANA USE: ICD-10-CM

## 2023-11-29 DIAGNOSIS — Z00.00 ANNUAL PHYSICAL EXAM: ICD-10-CM

## 2023-11-29 DIAGNOSIS — R73.03 PREDIABETES: Chronic | ICD-10-CM

## 2023-11-29 DIAGNOSIS — R53.82 CHRONIC FATIGUE: ICD-10-CM

## 2023-11-29 PROBLEM — Z23 NEED FOR VACCINATION: Status: ACTIVE | Noted: 2023-11-29

## 2023-11-29 PROCEDURE — 3078F DIAST BP <80 MM HG: CPT | Performed by: INTERNAL MEDICINE

## 2023-11-29 PROCEDURE — 3074F SYST BP LT 130 MM HG: CPT | Performed by: INTERNAL MEDICINE

## 2023-11-29 PROCEDURE — 99396 PREV VISIT EST AGE 40-64: CPT | Performed by: INTERNAL MEDICINE

## 2023-11-29 RX ORDER — METFORMIN HYDROCHLORIDE 500 MG/1
500 TABLET, EXTENDED RELEASE ORAL DAILY
Qty: 90 TABLET | Refills: 3 | Status: SHIPPED | OUTPATIENT
Start: 2023-11-29

## 2023-11-29 RX ORDER — GLUCOSAMINE HCL/CHONDROITIN SU 500-400 MG
CAPSULE ORAL
Qty: 200 EACH | Refills: 6 | Status: SHIPPED | OUTPATIENT
Start: 2023-11-29

## 2023-11-29 RX ORDER — LANCETS 30 GAUGE
EACH MISCELLANEOUS
Qty: 200 EACH | Refills: 6 | Status: SHIPPED | OUTPATIENT
Start: 2023-11-29

## 2023-11-29 ASSESSMENT — FIBROSIS 4 INDEX: FIB4 SCORE: 1.92

## 2023-11-29 NOTE — PROGRESS NOTES
PRIMARY CARE CLINIC VISIT    Chief complaint:    Pt is here for Annual Exam      History of Present Illness     Prediabetes  Chronic condition.   Noted with several blood test.  The patient stated that he has been on a diet therapy and exercise 2 hours a day without significant improvement.  Patient reported that his mother was diagnosed with diabetes    Dyslipidemia  Chronic ongoing condition.  The patient is taking atorvastatin 20 Mg daily.  Tolerating medication well.  Recent lab test result discussed with the patient    Marijuana use  Chronic condition.  Strongly advised the patient to discontinue.    Prostate hypertrophy  Chronic condition.  The patient is taking Rapaflo 80 Mg daily.  Patient denies fever chills dysuria or hematuria.      Allergies: Patient has no known allergies.    Current Outpatient Medications Ordered in Epic   Medication Sig Dispense Refill    metFORMIN ER (GLUCOPHAGE XR) 500 MG TABLET SR 24 HR Take 1 Tablet by mouth every day. 90 Tablet 3    atorvastatin (LIPITOR) 20 MG Tab Take 1 Tablet by mouth every evening. 90 Tablet 3    silodosin (RAPAFLO) 8 MG Cap capsule silodosin 8 mg capsule   TAKE ONE CAPSULE BY MOUTH ONE TIME DAILY      multivitamin (THERAGRAN) Tab Take 1 Tablet by mouth every day.      Omega-3 Fatty Acids (FISH OIL) 1000 MG Cap capsule Take 1,000 mg by mouth every day.      Ascorbic Acid (VITAMIN C) 100 MG Chew Tab Chew 1 Tablet every day. With Zinc        No current Epic-ordered facility-administered medications on file.       Past Medical History:   Diagnosis Date    Dyslipidemia     High cholesterol     Impaired fasting blood sugar        Past Surgical History:   Procedure Laterality Date    TURBT (TRANSURETHRAL RESECTION OF BLADDER TUMOR)  9/3/2021    Procedure: TURBT (TRANSURETHRAL RESECTION OF BLADDER TUMOR) - MONOPOLAR;  Surgeon: Cristino Muñoz M.D.;  Location: SURGERY [facility];  Service: Urology    COLONOSCOPY WITH POLYP  6/19/13    asc colon polyp-adenomatous, mild  "diverticulosis asc colon       Family History   Problem Relation Age of Onset    Diabetes Mother     Hypertension Mother     Hyperlipidemia Mother     Heart Attack Father         age 60s    Hypertension Father     Hyperlipidemia Father     Diabetes Sister     Diabetes Brother     Heart Attack Brother         age 60    Diabetes Brother     No Known Problems Sister        Social History     Tobacco Use   Smoking Status Former    Current packs/day: 0.30    Average packs/day: 0.3 packs/day for 30.0 years (9.0 ttl pk-yrs)    Types: Cigarettes   Smokeless Tobacco Never       Social History     Substance and Sexual Activity   Alcohol Use Not Currently    Alcohol/week: 1.2 oz    Types: 1 Cans of beer, 1 Shots of liquor per week    Comment: rui       Review of systems:  As per HPI above. All other systems reviewed and negative.      Past Medical, Social, and Family history reviewed and updated in EPIC     Objective     /78 (BP Location: Right arm, Patient Position: Sitting, BP Cuff Size: Adult)   Pulse 69   Temp 37.2 °C (98.9 °F) (Temporal)   Ht 1.778 m (5' 10\")   Wt 70.1 kg (154 lb 9.6 oz)   SpO2 97%    Body mass index is 22.18 kg/m².    General: alert in no apparent distress.  Cardiovascular: regular rate and rhythm  Pulmonary: lungs : no wheezing   Gastrointestinal: BS present.    normal male genitalia no urethral discharge  Rectal no active bleeding.  Prostate no obvious mass nontender  Cranial nerve II to XII grossly intact        Lab Results   Component Value Date/Time    HBA1C 6.3 (H) 11/20/2023 06:08 AM    HBA1C 5.9 (H) 04/07/2023 06:19 AM    HBA1C 5.8 (H) 09/12/2022 06:05 AM       Lab Results   Component Value Date/Time    WBC 5.6 02/02/2022 06:13 AM    HEMOGLOBIN 15.2 02/02/2022 06:13 AM    HEMATOCRIT 46.5 02/02/2022 06:13 AM    MCV 84.9 02/02/2022 06:13 AM    PLATELETCT 252 02/02/2022 06:13 AM         Lab Results   Component Value Date/Time    SODIUM 142 11/20/2023 06:08 AM    POTASSIUM 4.1 " 11/20/2023 06:08 AM    GLUCOSE 120 (H) 11/20/2023 06:08 AM    BUN 15 11/20/2023 06:08 AM    CREATININE 0.89 11/20/2023 06:08 AM       Lab Results   Component Value Date/Time    CHOLSTRLTOT 153 11/20/2023 06:08 AM    LDL 81 11/20/2023 06:08 AM    HDL 48 11/20/2023 06:08 AM    TRIGLYCERIDE 121 11/20/2023 06:08 AM       Lab Results   Component Value Date/Time    ALTSGPT 24 11/20/2023 06:08 AM           Assessment and Plan     1. Annual physical exam    2. Dyslipidemia  - Lipid Profile; Future  Chronic stable condition.  Continue atorvastatin 20 Mg daily.  Chronic stable.  Continue Rapaflo 80 Mg daily.    3. Prostate hypertrophy  Chronic stable condition.  Continue with Rapaflo 8 Mg daily.  Continue follow-up with urology service as directed      4. Prediabetes  - HEMOGLOBIN A1C; Future  - Basic Metabolic Panel; Future  Chronic condition.  Chart review show patient with increasing A1c trend.  Recommend to continue diet and exercise.  Advised the patient to consider taking metformin 500 mg daily.  Potential side effects of medication discussed with the patient.    5. Marijuana use  Chronic condition.  Strongly advised the patient to discontinue use.                    ANTICIPATORY GUIDANCE  Patient Counseling:  -Cholesterol screening. Fasting lipid panel  -Diabetes screening. Fasting blood sugar  -Diet: advised lean meats, fruits, vegetables, whole grains  -Discussed Healthful lifestyle measures. Pt to avoid tobacco, ETOH, and drug use.  -Pt to continue with regular exercise/walking activities  -Advised sun protection, sunscreen use  -Injury prevention: discussed safety seat belts  -Discussed preventive immunizations  -Recommend patient to schedule a yearly eye examination and dental exam                   Please note that this dictation was created using voice recognition software. I have made every reasonable attempt to correct obvious errors, but I expect that there are errors of grammar and possibly content that I  did not discover before finalizing the note.    Tomas Puckett MD  Internal Medicine  [facility]

## 2023-11-29 NOTE — ASSESSMENT & PLAN NOTE
Chronic ongoing condition.  The patient is taking atorvastatin 20 Mg daily.  Tolerating medication well.  Recent lab test result discussed with the patient

## 2023-11-29 NOTE — ASSESSMENT & PLAN NOTE
Chronic condition.   Noted with several blood test.  The patient stated that he has been on a diet therapy and exercise 2 hours a day without significant improvement.  Patient reported that his mother was diagnosed with diabetes

## 2023-11-29 NOTE — ASSESSMENT & PLAN NOTE
Chronic condition.  The patient is taking Rapaflo 80 Mg daily.  Patient denies fever chills dysuria or hematuria.

## 2023-12-21 ENCOUNTER — TELEPHONE (OUTPATIENT)
Dept: HEALTH INFORMATION MANAGEMENT | Facility: OTHER | Age: 63
End: 2023-12-21
Payer: COMMERCIAL

## 2024-02-08 PROBLEM — I70.0 ATHEROSCLEROSIS OF AORTA (HCC): Status: ACTIVE | Noted: 2024-02-08

## 2024-03-05 RX ORDER — BLOOD-GLUCOSE SENSOR
EACH MISCELLANEOUS
Qty: 6 EACH | Refills: 3 | Status: SHIPPED | OUTPATIENT
Start: 2024-03-05

## 2024-03-05 RX ORDER — BLOOD-GLUCOSE SENSOR
EACH MISCELLANEOUS
Qty: 6 EACH | Refills: 3 | Status: SHIPPED | OUTPATIENT
Start: 2024-03-05 | End: 2024-03-05

## 2024-05-08 NOTE — PROGRESS NOTES
Med rec complete per pt at bedside  Interviewed pt with family at bedside with permission from pt  Allergies reviewed and updated.       Noted    I don't know what else to do for her since her problems are due to poor social circumstances.

## 2024-05-22 ENCOUNTER — HOSPITAL ENCOUNTER (OUTPATIENT)
Dept: LAB | Facility: MEDICAL CENTER | Age: 64
End: 2024-05-22
Attending: INTERNAL MEDICINE
Payer: MEDICARE

## 2024-05-22 DIAGNOSIS — R73.03 PREDIABETES: Chronic | ICD-10-CM

## 2024-05-22 DIAGNOSIS — E78.5 DYSLIPIDEMIA: ICD-10-CM

## 2024-05-22 DIAGNOSIS — R53.82 CHRONIC FATIGUE: ICD-10-CM

## 2024-05-22 LAB
ANION GAP SERPL CALC-SCNC: 13 MMOL/L (ref 7–16)
BUN SERPL-MCNC: 12 MG/DL (ref 8–22)
CALCIUM SERPL-MCNC: 9.6 MG/DL (ref 8.5–10.5)
CHLORIDE SERPL-SCNC: 104 MMOL/L (ref 96–112)
CHOLEST SERPL-MCNC: 144 MG/DL (ref 100–199)
CO2 SERPL-SCNC: 25 MMOL/L (ref 20–33)
CREAT SERPL-MCNC: 0.79 MG/DL (ref 0.5–1.4)
ERYTHROCYTE [DISTWIDTH] IN BLOOD BY AUTOMATED COUNT: 42.4 FL (ref 35.9–50)
EST. AVERAGE GLUCOSE BLD GHB EST-MCNC: 120 MG/DL
GFR SERPLBLD CREATININE-BSD FMLA CKD-EPI: 98 ML/MIN/1.73 M 2
GLUCOSE SERPL-MCNC: 121 MG/DL (ref 65–99)
HBA1C MFR BLD: 5.8 % (ref 4–5.6)
HCT VFR BLD AUTO: 45 % (ref 42–52)
HDLC SERPL-MCNC: 50 MG/DL
HGB BLD-MCNC: 14.7 G/DL (ref 14–18)
LDLC SERPL CALC-MCNC: 76 MG/DL
MCH RBC QN AUTO: 27.2 PG (ref 27–33)
MCHC RBC AUTO-ENTMCNC: 32.7 G/DL (ref 32.3–36.5)
MCV RBC AUTO: 83.3 FL (ref 81.4–97.8)
PLATELET # BLD AUTO: 249 K/UL (ref 164–446)
PMV BLD AUTO: 8.8 FL (ref 9–12.9)
POTASSIUM SERPL-SCNC: 4.2 MMOL/L (ref 3.6–5.5)
RBC # BLD AUTO: 5.4 M/UL (ref 4.7–6.1)
SODIUM SERPL-SCNC: 142 MMOL/L (ref 135–145)
TRIGL SERPL-MCNC: 88 MG/DL (ref 0–149)
WBC # BLD AUTO: 6 K/UL (ref 4.8–10.8)

## 2024-05-24 DIAGNOSIS — E78.5 DYSLIPIDEMIA: ICD-10-CM

## 2024-05-28 RX ORDER — ATORVASTATIN CALCIUM 20 MG/1
20 TABLET, FILM COATED ORAL EVERY EVENING
Qty: 100 TABLET | Refills: 0 | Status: SHIPPED | OUTPATIENT
Start: 2024-05-28 | End: 2024-05-29

## 2024-05-28 NOTE — TELEPHONE ENCOUNTER
Received request via: Pharmacy    Was the patient seen in the last year in this department? Yes    Does the patient have an active prescription (recently filled or refills available) for medication(s) requested? No    Pharmacy Name: [facility]     Does the patient have [facility] and need 100 day supply (blood pressure, diabetes and cholesterol meds only)? Yes, quantity updated to 100 days

## 2024-05-29 ENCOUNTER — OFFICE VISIT (OUTPATIENT)
Dept: MEDICAL GROUP | Facility: PHYSICIAN GROUP | Age: 64
End: 2024-05-29
Payer: MEDICARE

## 2024-05-29 VITALS
BODY MASS INDEX: 22.22 KG/M2 | WEIGHT: 155.2 LBS | HEART RATE: 55 BPM | DIASTOLIC BLOOD PRESSURE: 76 MMHG | OXYGEN SATURATION: 99 % | SYSTOLIC BLOOD PRESSURE: 138 MMHG | TEMPERATURE: 97.7 F | HEIGHT: 70 IN

## 2024-05-29 DIAGNOSIS — R73.03 PREDIABETES: Chronic | ICD-10-CM

## 2024-05-29 DIAGNOSIS — N40.0 PROSTATE HYPERTROPHY: ICD-10-CM

## 2024-05-29 DIAGNOSIS — M17.12 PRIMARY OSTEOARTHRITIS OF LEFT KNEE: ICD-10-CM

## 2024-05-29 DIAGNOSIS — R53.82 CHRONIC FATIGUE: ICD-10-CM

## 2024-05-29 DIAGNOSIS — E78.5 DYSLIPIDEMIA: ICD-10-CM

## 2024-05-29 DIAGNOSIS — Z23 NEED FOR VACCINATION: ICD-10-CM

## 2024-05-29 DIAGNOSIS — F12.90 MARIJUANA USE: ICD-10-CM

## 2024-05-29 DIAGNOSIS — Z13.6 ENCOUNTER FOR ABDOMINAL AORTIC ANEURYSM (AAA) SCREENING: ICD-10-CM

## 2024-05-29 DIAGNOSIS — N06.9 ISOLATED PROTEINURIA WITH MORPHOLOGIC LESION: ICD-10-CM

## 2024-05-29 DIAGNOSIS — Z12.5 SCREENING FOR MALIGNANT NEOPLASM OF PROSTATE: ICD-10-CM

## 2024-05-29 PROBLEM — I70.0 ATHEROSCLEROSIS OF AORTA (HCC): Chronic | Status: ACTIVE | Noted: 2024-02-08

## 2024-05-29 RX ORDER — METFORMIN HYDROCHLORIDE 500 MG/1
500 TABLET, EXTENDED RELEASE ORAL DAILY
Qty: 100 TABLET | Refills: 3 | Status: SHIPPED | OUTPATIENT
Start: 2024-05-29

## 2024-05-29 RX ORDER — ATORVASTATIN CALCIUM 20 MG/1
20 TABLET, FILM COATED ORAL EVERY EVENING
Qty: 100 TABLET | Refills: 3 | Status: SHIPPED | OUTPATIENT
Start: 2024-05-29

## 2024-05-29 NOTE — ASSESSMENT & PLAN NOTE
Chronic , stable  Recommend pt to follow a healthy , well balance diet  Pt to continue with regular exercise activity and to maintain ideal weight.

## 2024-05-29 NOTE — ASSESSMENT & PLAN NOTE
Chronic condition.  Patient followed by urology service.  He is currently taking Rapaflo 8 Mg daily.  Patient denies fever chills dysuria or hematuria.

## 2024-05-29 NOTE — PROGRESS NOTES
PRIMARY CARE CLINIC VISIT        Chief Complaint   Patient presents with    Follow-Up     Lab results.      Follow-up hyperlipidemia  Prediabetes  Marijuana use  Prostate hypertrophy  Osteoarthritis of the knee  Vaccination status  Lab test result  Medication review        History of Present Illness     Dyslipidemia  Chronic condition.  Patient presently taking atorvastatin daily.  Patient tolerating treatment well.  Recent lab test result discussed with the patient.    Prediabetes  This is a chronic condition.  The patient currently taking metformin daily.  Patient denies significant hypoglycemic symptoms.  No new symptoms    Marijuana use  Chronic condition.  The patient stated that he has been using marijuana almost on daily basis.  Strongly advised the patient to discontinue.    Prostate hypertrophy  Chronic condition.  Patient followed by urology service.  He is currently taking Rapaflo 8 Mg daily.  Patient denies fever chills dysuria or hematuria.    BMI 21.0-21.9, adult  Chronic , stable  Recommend pt to follow a healthy , well balance diet  Pt to continue with regular exercise activity and to maintain ideal weight.     Osteoarthritis of left knee  Chronic condition.  The patient had an x-ray done previously.  The patient reported the pain is mild and tolerable.  Patient denied recent trauma or injury.    Need for vaccination  Patient is due for Tdap vaccine    Current Outpatient Medications on File Prior to Visit   Medication Sig Dispense Refill    vitamin D3 (CHOLECALCIFEROL) 400 UNIT Tab Take 1,000 Units by mouth every day.      silodosin (RAPAFLO) 8 MG Cap capsule silodosin 8 mg capsule   TAKE ONE CAPSULE BY MOUTH ONE TIME DAILY      Continuous Blood Gluc Sensor (FREESTYLE JAMIA 3 SENSOR) [facility] Continue glucose monitoring .  Change every 2 weeks 6 Each 3    Blood Glucose Meter Kit Freestyle FREEDOM Lite.  Check blood sugar  1x per day and prn ssx of high or low sugar 1 Kit 0    Blood Glucose Test Strips  FREESTYLE FREEDOM LITE . Check blood sugar 1x per day and prn ssx of high or low sugar 100 Strip 3    Lancets Per insurance coverage. Check blood sugar 1x per day and prn ssx of high or low sugar 200 Each 6    Alcohol Swabs Per insurance coverage. Wipe site with prep pad prior to injection 200 Each 6    multivitamin (THERAGRAN) Tab Take 1 Tablet by mouth every day.      Omega-3 Fatty Acids (FISH OIL) 1000 MG Cap capsule Take 1,000 mg by mouth every day.      Ascorbic Acid (VITAMIN C) 100 MG Chew Tab Chew 1 Tablet every day. With Zinc        No current facility-administered medications on file prior to visit.        Allergies: Patient has no known allergies.    Current Outpatient Medications Ordered in Epic   Medication Sig Dispense Refill    vitamin D3 (CHOLECALCIFEROL) 400 UNIT Tab Take 1,000 Units by mouth every day.      metFORMIN ER (GLUCOPHAGE XR) 500 MG TABLET SR 24 HR Take 1 Tablet by mouth every day. 100 Tablet 3    atorvastatin (LIPITOR) 20 MG Tab Take 1 Tablet by mouth every evening. 100 Tablet 3    silodosin (RAPAFLO) 8 MG Cap capsule silodosin 8 mg capsule   TAKE ONE CAPSULE BY MOUTH ONE TIME DAILY      Continuous Blood Gluc Sensor (FREESTYLE JAMIA 3 SENSOR) [facility] Continue glucose monitoring .  Change every 2 weeks 6 Each 3    Blood Glucose Meter Kit Freestyle FREEDOM Lite.  Check blood sugar  1x per day and prn ssx of high or low sugar 1 Kit 0    Blood Glucose Test Strips FREESTYLE FREEDOM LITE . Check blood sugar 1x per day and prn ssx of high or low sugar 100 Strip 3    Lancets Per insurance coverage. Check blood sugar 1x per day and prn ssx of high or low sugar 200 Each 6    Alcohol Swabs Per insurance coverage. Wipe site with prep pad prior to injection 200 Each 6    multivitamin (THERAGRAN) Tab Take 1 Tablet by mouth every day.      Omega-3 Fatty Acids (FISH OIL) 1000 MG Cap capsule Take 1,000 mg by mouth every day.      Ascorbic Acid (VITAMIN C) 100 MG Chew Tab Chew 1 Tablet every day. With Zinc         No current Epic-ordered facility-administered medications on file.       Past Medical History:   Diagnosis Date    Dyslipidemia     High cholesterol     Impaired fasting blood sugar        Past Surgical History:   Procedure Laterality Date    TURBT (TRANSURETHRAL RESECTION OF BLADDER TUMOR)  9/3/2021    Procedure: TURBT (TRANSURETHRAL RESECTION OF BLADDER TUMOR) - MONOPOLAR;  Surgeon: Cristino Muñoz M.D.;  Location: SURGERY [facility];  Service: Urology    COLONOSCOPY WITH POLYP  6/19/13    asc colon polyp-adenomatous, mild diverticulosis asc colon       Family History   Problem Relation Age of Onset    Diabetes Mother     Hypertension Mother     Hyperlipidemia Mother     Heart Attack Father         age 60s    Hypertension Father     Hyperlipidemia Father     Diabetes Sister     Diabetes Brother     Heart Attack Brother         age 60    Diabetes Brother     No Known Problems Sister        Social History     Tobacco Use   Smoking Status Former    Current packs/day: 0.30    Average packs/day: 0.3 packs/day for 30.0 years (9.0 ttl pk-yrs)    Types: Cigarettes   Smokeless Tobacco Never       Social History     Substance and Sexual Activity   Alcohol Use Not Currently    Alcohol/week: 1.2 oz    Types: 1 Cans of beer, 1 Shots of liquor per week    Comment: rui       Review of systems  As per HPI above. All other systems reviewed and negative.      Past Medical, Social, and Family history reviewed and updated in [facility]       LAB DATA:    Lab Results   Component Value Date/Time    HBA1C 5.8 (H) 05/22/2024 06:10 AM    HBA1C 6.3 (H) 11/20/2023 06:08 AM    HBA1C 5.9 (H) 04/07/2023 06:19 AM       Lab Results   Component Value Date/Time    WBC 6.0 05/22/2024 06:10 AM    HEMOGLOBIN 14.7 05/22/2024 06:10 AM    HEMATOCRIT 45.0 05/22/2024 06:10 AM    MCV 83.3 05/22/2024 06:10 AM    PLATELETCT 249 05/22/2024 06:10 AM       Lab Results   Component Value Date/Time    SODIUM 142 05/22/2024 06:10 AM    POTASSIUM 4.2 05/22/2024  "06:10 AM    GLUCOSE 121 (H) 05/22/2024 06:10 AM    BUN 12 05/22/2024 06:10 AM    CREATININE 0.79 05/22/2024 06:10 AM       Lab Results   Component Value Date/Time    CHOLSTRLTOT 144 05/22/2024 06:10 AM    TRIGLYCERIDE 88 05/22/2024 06:10 AM    HDL 50 05/22/2024 06:10 AM    LDL 76 05/22/2024 06:10 AM       Lab Results   Component Value Date/Time    ALTSGPT 24 11/20/2023 06:08 AM          Objective     /76 (BP Location: Left arm, Patient Position: Sitting, BP Cuff Size: Adult)   Pulse (!) 55   Temp 36.5 °C (97.7 °F) (Temporal)   Ht 1.778 m (5' 10\")   Wt 70.4 kg (155 lb 3.2 oz)   SpO2 99%    Body mass index is 22.27 kg/m².    General: alert in no apparent distress.  Cardiovascular: regular rate and rhythm  Pulmonary: lungs : no wheezing   Gastrointestinal: BS present.     Knee : No signficant swelling redness or deformity.   ROM limited due to pain specifically w knee flexion/extension.    Assessment and Plan     1. Dyslipidemia  - atorvastatin (LIPITOR) 20 MG Tab; Take 1 Tablet by mouth every evening.  Dispense: 100 Tablet; Refill: 3  - ALANINE AMINO-TRANS; Future  - Lipid Profile; Future  Chronic stable condition.  Lipid panel within the normal range.  Recommend to continue atorvastatin 20 Mg daily    2. Prediabetes  - metFORMIN ER (GLUCOPHAGE XR) 500 MG TABLET SR 24 HR; Take 1 Tablet by mouth every day.  Dispense: 100 Tablet; Refill: 3  - HEMOGLOBIN A1C; Future  - Basic Metabolic Panel; Future  Chronic condition.  Stable.  Continue metformin 500 mg daily.  Continue with low sweet low-carb diet and regular exercise activity.    3. Marijuana use  Chronic condition.  Uncontrolled.  Recommend patient to discontinue marijuana use  4. Prostate hypertrophy  This is a chronic stable condition.  Continue Rapaflo 80 Mg daily.  Continue follow-up with urology service.      5. Primary osteoarthritis of left knee  Chronic and stable condition.  Offered to refer the patient to orthopedic patient not interested as his " symptom is mild and tolerable.    6. Encounter for abdominal aortic aneurysm (AAA) screening  - US-ABDOMINAL AORTA SCREENING (AAA); Future    7. Need for vaccination  - Tdap Vaccine =>6YO IM    8.BMI 21.0-21.9, adult  Chronic condition.  Recommend patient to continue with healthy diet and regular exercise activity and to try to maintain ideal weight.          Attestation: I spent:   34  minutes -    That includes time for chart review before the visit, the actual patient visit, and time spent on documentation in EMR after the visit.  Chart review/prep, review of other providers' records, imaging/lab review, face-to-face time for history/examination, pt's counseling/education, ordering, prescribing,  review of results/meds/ treatment plan with patient, and care coordination.             Please note that this dictation was created using voice recognition software. I have made every reasonable attempt to correct obvious errors, but I expect that there are errors of grammar and possibly content that I did not discover before finalizing the note.    Tomas Puckett MD  Internal Medicine  [facility]

## 2024-05-29 NOTE — ASSESSMENT & PLAN NOTE
Chronic condition.  The patient stated that he has been using marijuana almost on daily basis.  Strongly advised the patient to discontinue.

## 2024-05-29 NOTE — ASSESSMENT & PLAN NOTE
Chronic condition.  Patient presently taking atorvastatin daily.  Patient tolerating treatment well.  Recent lab test result discussed with the patient.

## 2024-05-29 NOTE — ASSESSMENT & PLAN NOTE
This is a chronic condition.  The patient currently taking metformin daily.  Patient denies significant hypoglycemic symptoms.  No new symptoms

## 2024-05-29 NOTE — ASSESSMENT & PLAN NOTE
Chronic condition.  The patient had an x-ray done previously.  The patient reported the pain is mild and tolerable.  Patient denied recent trauma or injury.

## 2024-07-11 ENCOUNTER — HOSPITAL ENCOUNTER (OUTPATIENT)
Dept: RADIOLOGY | Facility: MEDICAL CENTER | Age: 64
End: 2024-07-11
Attending: INTERNAL MEDICINE
Payer: MEDICARE

## 2024-07-11 DIAGNOSIS — Z13.6 ENCOUNTER FOR ABDOMINAL AORTIC ANEURYSM (AAA) SCREENING: ICD-10-CM

## 2024-07-11 PROCEDURE — 76706 US ABDL AORTA SCREEN AAA: CPT

## 2024-08-28 ENCOUNTER — PATIENT MESSAGE (OUTPATIENT)
Dept: MEDICAL GROUP | Facility: PHYSICIAN GROUP | Age: 64
End: 2024-08-28
Payer: MEDICARE

## 2024-08-28 DIAGNOSIS — R73.03 PREDIABETES: Chronic | ICD-10-CM

## 2024-08-28 DIAGNOSIS — E78.5 DYSLIPIDEMIA: ICD-10-CM

## 2024-08-28 RX ORDER — METFORMIN HCL 500 MG
500 TABLET, EXTENDED RELEASE 24 HR ORAL DAILY
Qty: 100 TABLET | Refills: 0 | Status: SHIPPED | OUTPATIENT
Start: 2024-08-28

## 2024-08-28 RX ORDER — METFORMIN HCL 500 MG
500 TABLET, EXTENDED RELEASE 24 HR ORAL DAILY
Qty: 100 TABLET | Refills: 0 | Status: CANCELLED | OUTPATIENT
Start: 2024-08-28

## 2024-08-28 NOTE — TELEPHONE ENCOUNTER
Received request via: Pharmacy    Was the patient seen in the last year in this department? Yes    Does the patient have an active prescription (recently filled or refills available) for medication(s) requested? Yes. Pharmacy change    [facility]    Does the patient have [facility] Plus and need 100-day supply? (This applies to ALL medications) Yes, quantity updated to 100 days

## 2024-08-29 RX ORDER — ATORVASTATIN CALCIUM 20 MG/1
20 TABLET, FILM COATED ORAL EVERY EVENING
Qty: 100 TABLET | Refills: 0 | Status: SHIPPED | OUTPATIENT
Start: 2024-08-29

## 2024-08-29 NOTE — TELEPHONE ENCOUNTER
Received request via: Patient    Was the patient seen in the last year in this department? Yes    Does the patient have an active prescription (recently filled or refills available) for medication(s) requested? Yes. Pharmacy change    [facility] Name: [facility]    Does the patient have prison Plus and need 100-day supply? (This applies to ALL medications) Yes, quantity updated to 100 days

## 2024-09-03 DIAGNOSIS — E78.5 DYSLIPIDEMIA: ICD-10-CM

## 2024-09-03 RX ORDER — ATORVASTATIN CALCIUM 20 MG/1
20 TABLET, FILM COATED ORAL EVERY EVENING
Qty: 100 TABLET | Refills: 3 | Status: SHIPPED | OUTPATIENT
Start: 2024-09-03

## 2024-09-03 NOTE — TELEPHONE ENCOUNTER
Received request via: Patient    Was the patient seen in the last year in this department? Yes    Does the patient have an active prescription (recently filled or refills available) for medication(s) requested? No      Does the patient have alf Plus and need 100-day supply? (This applies to ALL medications) Yes, quantity updated to 100 days

## 2024-09-09 ENCOUNTER — HOSPITAL ENCOUNTER (OUTPATIENT)
Dept: LAB | Facility: MEDICAL CENTER | Age: 64
End: 2024-09-09
Attending: UROLOGY
Payer: MEDICARE

## 2024-09-09 ENCOUNTER — APPOINTMENT (OUTPATIENT)
Dept: LAB | Facility: MEDICAL CENTER | Age: 64
End: 2024-09-09
Payer: MEDICARE

## 2024-09-09 LAB — PSA SERPL-MCNC: 3.7 NG/ML (ref 0–4)

## 2024-09-09 PROCEDURE — 36415 COLL VENOUS BLD VENIPUNCTURE: CPT

## 2024-09-09 PROCEDURE — 84153 ASSAY OF PSA TOTAL: CPT

## 2024-11-22 ENCOUNTER — APPOINTMENT (OUTPATIENT)
Dept: LAB | Facility: MEDICAL CENTER | Age: 64
End: 2024-11-22
Payer: MEDICARE

## 2024-11-25 ENCOUNTER — HOSPITAL ENCOUNTER (OUTPATIENT)
Dept: LAB | Facility: MEDICAL CENTER | Age: 64
End: 2024-11-25
Attending: UROLOGY
Payer: MEDICARE

## 2024-11-25 ENCOUNTER — HOSPITAL ENCOUNTER (OUTPATIENT)
Dept: LAB | Facility: MEDICAL CENTER | Age: 64
End: 2024-11-25
Attending: INTERNAL MEDICINE
Payer: MEDICARE

## 2024-11-25 DIAGNOSIS — R53.82 CHRONIC FATIGUE: ICD-10-CM

## 2024-11-25 DIAGNOSIS — N06.9 ISOLATED PROTEINURIA WITH MORPHOLOGIC LESION: ICD-10-CM

## 2024-11-25 DIAGNOSIS — E78.5 DYSLIPIDEMIA: ICD-10-CM

## 2024-11-25 DIAGNOSIS — Z12.5 SCREENING FOR MALIGNANT NEOPLASM OF PROSTATE: ICD-10-CM

## 2024-11-25 DIAGNOSIS — R73.03 PREDIABETES: Chronic | ICD-10-CM

## 2024-11-25 LAB
ALT SERPL-CCNC: 25 U/L (ref 2–50)
ANION GAP SERPL CALC-SCNC: 11 MMOL/L (ref 7–16)
BASOPHILS # BLD AUTO: 1.6 % (ref 0–1.8)
BASOPHILS # BLD: 0.11 K/UL (ref 0–0.12)
BUN SERPL-MCNC: 14 MG/DL (ref 8–22)
CALCIUM SERPL-MCNC: 9.7 MG/DL (ref 8.5–10.5)
CHLORIDE SERPL-SCNC: 104 MMOL/L (ref 96–112)
CHOLEST SERPL-MCNC: 155 MG/DL (ref 100–199)
CO2 SERPL-SCNC: 24 MMOL/L (ref 20–33)
CREAT SERPL-MCNC: 0.98 MG/DL (ref 0.5–1.4)
CREAT UR-MCNC: 94.67 MG/DL
EOSINOPHIL # BLD AUTO: 0.48 K/UL (ref 0–0.51)
EOSINOPHIL NFR BLD: 7 % (ref 0–6.9)
ERYTHROCYTE [DISTWIDTH] IN BLOOD BY AUTOMATED COUNT: 40 FL (ref 35.9–50)
EST. AVERAGE GLUCOSE BLD GHB EST-MCNC: 123 MG/DL
FASTING STATUS PATIENT QL REPORTED: NORMAL
GFR SERPLBLD CREATININE-BSD FMLA CKD-EPI: 85 ML/MIN/1.73 M 2
GLUCOSE SERPL-MCNC: 119 MG/DL (ref 65–99)
HBA1C MFR BLD: 5.9 % (ref 4–5.6)
HCT VFR BLD AUTO: 47.4 % (ref 42–52)
HDLC SERPL-MCNC: 53 MG/DL
HGB BLD-MCNC: 15.3 G/DL (ref 14–18)
IMM GRANULOCYTES # BLD AUTO: 0.01 K/UL (ref 0–0.11)
IMM GRANULOCYTES NFR BLD AUTO: 0.1 % (ref 0–0.9)
LDLC SERPL CALC-MCNC: 83 MG/DL
LYMPHOCYTES # BLD AUTO: 2.5 K/UL (ref 1–4.8)
LYMPHOCYTES NFR BLD: 36.6 % (ref 22–41)
MCH RBC QN AUTO: 26.8 PG (ref 27–33)
MCHC RBC AUTO-ENTMCNC: 32.3 G/DL (ref 32.3–36.5)
MCV RBC AUTO: 83 FL (ref 81.4–97.8)
MICROALBUMIN UR-MCNC: <1.2 MG/DL
MICROALBUMIN/CREAT UR: NORMAL MG/G (ref 0–30)
MONOCYTES # BLD AUTO: 0.71 K/UL (ref 0–0.85)
MONOCYTES NFR BLD AUTO: 10.4 % (ref 0–13.4)
NEUTROPHILS # BLD AUTO: 3.02 K/UL (ref 1.82–7.42)
NEUTROPHILS NFR BLD: 44.3 % (ref 44–72)
NRBC # BLD AUTO: 0 K/UL
NRBC BLD-RTO: 0 /100 WBC (ref 0–0.2)
PLATELET # BLD AUTO: 291 K/UL (ref 164–446)
PMV BLD AUTO: 8.6 FL (ref 9–12.9)
POTASSIUM SERPL-SCNC: 4 MMOL/L (ref 3.6–5.5)
PSA SERPL DL<=0.01 NG/ML-MCNC: 2.57 NG/ML (ref 0–4)
RBC # BLD AUTO: 5.71 M/UL (ref 4.7–6.1)
SODIUM SERPL-SCNC: 139 MMOL/L (ref 135–145)
TRIGL SERPL-MCNC: 94 MG/DL (ref 0–149)
WBC # BLD AUTO: 6.8 K/UL (ref 4.8–10.8)

## 2024-11-25 PROCEDURE — 80048 BASIC METABOLIC PNL TOTAL CA: CPT

## 2024-11-25 PROCEDURE — 84153 ASSAY OF PSA TOTAL: CPT

## 2024-11-25 PROCEDURE — 80061 LIPID PANEL: CPT

## 2024-11-25 PROCEDURE — 36415 COLL VENOUS BLD VENIPUNCTURE: CPT

## 2024-11-25 PROCEDURE — 85025 COMPLETE CBC W/AUTO DIFF WBC: CPT

## 2024-11-25 PROCEDURE — 84460 ALANINE AMINO (ALT) (SGPT): CPT

## 2024-11-25 PROCEDURE — 82043 UR ALBUMIN QUANTITATIVE: CPT

## 2024-11-25 PROCEDURE — 83036 HEMOGLOBIN GLYCOSYLATED A1C: CPT

## 2024-11-25 PROCEDURE — 82570 ASSAY OF URINE CREATININE: CPT

## 2024-11-30 ENCOUNTER — HOSPITAL ENCOUNTER (OUTPATIENT)
Dept: RADIOLOGY | Facility: MEDICAL CENTER | Age: 64
End: 2024-11-30
Attending: UROLOGY
Payer: MEDICARE

## 2024-11-30 DIAGNOSIS — N40.1 HYPERTROPHY OF PROSTATE WITH URINARY OBSTRUCTION: ICD-10-CM

## 2024-11-30 DIAGNOSIS — N13.8 HYPERTROPHY OF PROSTATE WITH URINARY OBSTRUCTION: ICD-10-CM

## 2024-11-30 PROCEDURE — A9579 GAD-BASE MR CONTRAST NOS,1ML: HCPCS | Mod: JZ | Performed by: UROLOGY

## 2024-11-30 PROCEDURE — 700111 HCHG RX REV CODE 636 W/ 250 OVERRIDE (IP): Mod: JZ,JG | Performed by: RADIOLOGY

## 2024-11-30 PROCEDURE — 700117 HCHG RX CONTRAST REV CODE 255: Mod: JZ | Performed by: UROLOGY

## 2024-11-30 PROCEDURE — 72197 MRI PELVIS W/O & W/DYE: CPT

## 2024-11-30 RX ADMIN — GLUCAGON 1 MG: 1 INJECTION, POWDER, LYOPHILIZED, FOR SOLUTION INTRAMUSCULAR; INTRAVENOUS at 13:14

## 2024-11-30 RX ADMIN — GADOTERIDOL 15 ML: 279.3 INJECTION, SOLUTION INTRAVENOUS at 13:50

## 2024-12-03 ENCOUNTER — OFFICE VISIT (OUTPATIENT)
Dept: MEDICAL GROUP | Facility: PHYSICIAN GROUP | Age: 64
End: 2024-12-03
Payer: MEDICARE

## 2024-12-03 VITALS
WEIGHT: 165.4 LBS | TEMPERATURE: 98 F | SYSTOLIC BLOOD PRESSURE: 128 MMHG | DIASTOLIC BLOOD PRESSURE: 76 MMHG | HEIGHT: 70 IN | OXYGEN SATURATION: 97 % | HEART RATE: 66 BPM | BODY MASS INDEX: 23.68 KG/M2

## 2024-12-03 DIAGNOSIS — N40.0 PROSTATE HYPERTROPHY: Chronic | ICD-10-CM

## 2024-12-03 DIAGNOSIS — I70.0 ATHEROSCLEROSIS OF AORTA (HCC): Chronic | ICD-10-CM

## 2024-12-03 DIAGNOSIS — F12.90 MARIJUANA USE: Chronic | ICD-10-CM

## 2024-12-03 DIAGNOSIS — R93.89 ABNORMAL MRI: ICD-10-CM

## 2024-12-03 DIAGNOSIS — E55.9 VITAMIN D DEFICIENCY: ICD-10-CM

## 2024-12-03 DIAGNOSIS — R73.03 PREDIABETES: Chronic | ICD-10-CM

## 2024-12-03 DIAGNOSIS — E78.5 DYSLIPIDEMIA: Chronic | ICD-10-CM

## 2024-12-03 PROCEDURE — 99214 OFFICE O/P EST MOD 30 MIN: CPT | Performed by: INTERNAL MEDICINE

## 2024-12-03 PROCEDURE — 3074F SYST BP LT 130 MM HG: CPT | Performed by: INTERNAL MEDICINE

## 2024-12-03 PROCEDURE — 3078F DIAST BP <80 MM HG: CPT | Performed by: INTERNAL MEDICINE

## 2024-12-03 RX ORDER — METFORMIN HYDROCHLORIDE 500 MG/1
500 TABLET, EXTENDED RELEASE ORAL DAILY
Qty: 100 TABLET | Refills: 4 | Status: SHIPPED | OUTPATIENT
Start: 2024-12-03

## 2024-12-03 NOTE — ASSESSMENT & PLAN NOTE
This is a chronic condition.  The patient has been taking vitamin D supplementation.  No new symptoms reported.

## 2024-12-03 NOTE — ASSESSMENT & PLAN NOTE
MRI ordered by urologist recently.  Result discussed with the patient.    IMPRESSION:        1. No suspicious prostate lesions.  2. No pelvic lymphadenopathy or suspicious pelvic osseous lesions.  3. Mild irregularity of the posterior urinary bladder wall could be trabeculation. Subtle mucosal mass cannot be excluded.  4. The prostate measures 4.9 x 2.8 x 3.1 cm with a volume of 18 cc.    Recommend patient to follow-up with urologist regarding the abnormal finding of the irregularity of the urinary bladder wall.

## 2024-12-03 NOTE — ASSESSMENT & PLAN NOTE
Chronic condition.  The patient is taking atorvastatin.  Pt to continue with a healthy low saturated fat, low sugar, mostly plant based diet and regular exercise.   It is also important to maintain good blood pressure control.

## 2024-12-03 NOTE — ASSESSMENT & PLAN NOTE
Chronic.  The patient has been using marijuana on a regular basis.*Advised the patient to discontinue.

## 2024-12-03 NOTE — PROGRESS NOTES
PRIMARY CARE CLINIC VISIT        Chief Complaint   Patient presents with    Follow-Up     6 month check up    Follow-up hyperlipidemia  Prediabetes  Marijuana use  Prostate hypertrophy  Abnormal MRI bladder  Vitamin D deficiency  Rx refill  Lab test results          History of Present Illness     Dyslipidemia  This is a chronic condition.  The patient presently taking atorvastatin.  Lipid panel result discussed with the patient.  Patient tolerating the medication well.    Prediabetes  Chronic ongoing condition.  The patient is being treated with metformin.  Blood test show A1c mildly elevated.  Stable.  Patient denies significant hypoglycemia.    Marijuana use  Chronic.  The patient has been using marijuana on a regular basis.*Advised the patient to discontinue.    Prostate hypertrophy  This is a chronic condition.  Followed by urologist.  Patient is taking Rapaflo.  Patient denies fever chill dysuria or hematuria.    Atherosclerosis of aorta (HCC)  Chronic condition.  The patient is taking atorvastatin.  Pt to continue with a healthy low saturated fat, low sugar, mostly plant based diet and regular exercise.   It is also important to maintain good blood pressure control.       Abnormal MRI  MRI ordered by urologist recently.  Result discussed with the patient.    IMPRESSION:        1. No suspicious prostate lesions.  2. No pelvic lymphadenopathy or suspicious pelvic osseous lesions.  3. Mild irregularity of the posterior urinary bladder wall could be trabeculation. Subtle mucosal mass cannot be excluded.  4. The prostate measures 4.9 x 2.8 x 3.1 cm with a volume of 18 cc.    Recommend patient to follow-up with urologist regarding the abnormal finding of the irregularity of the urinary bladder wall.    Vitamin D deficiency  This is a chronic condition.  The patient has been taking vitamin D supplementation.  No new symptoms reported.    Current Outpatient Medications on File Prior to Visit   Medication Sig Dispense  Refill    atorvastatin (LIPITOR) 20 MG Tab Take 1 Tablet by mouth every evening. 100 Tablet 3    vitamin D3 (CHOLECALCIFEROL) 400 UNIT Tab Take 1,000 Units by mouth every day.      Continuous Blood Gluc Sensor (FREESTYLE JAMIA 3 SENSOR) [facility] Continue glucose monitoring .  Change every 2 weeks 6 Each 3    Blood Glucose Meter Kit Freestyle FREEDOM Lite.  Check blood sugar  1x per day and prn ssx of high or low sugar 1 Kit 0    Blood Glucose Test Strips FREESTYLE FREEDOM LITE . Check blood sugar 1x per day and prn ssx of high or low sugar 100 Strip 3    Lancets Per insurance coverage. Check blood sugar 1x per day and prn ssx of high or low sugar 200 Each 6    Alcohol Swabs Per insurance coverage. Wipe site with prep pad prior to injection 200 Each 6    silodosin (RAPAFLO) 8 MG Cap capsule silodosin 8 mg capsule   TAKE ONE CAPSULE BY MOUTH ONE TIME DAILY      multivitamin (THERAGRAN) Tab Take 1 Tablet by mouth every day.      Omega-3 Fatty Acids (FISH OIL) 1000 MG Cap capsule Take 1,000 mg by mouth every day.      Ascorbic Acid (VITAMIN C) 100 MG Chew Tab Chew 1 Tablet every day. With Zinc        No current facility-administered medications on file prior to visit.        Allergies: Patient has no known allergies.    Current Outpatient Medications Ordered in Epic   Medication Sig Dispense Refill    metFORMIN ER (GLUCOPHAGE XR) 500 MG TABLET SR 24 HR Take 1 Tablet by mouth every day. 100 Tablet 4    atorvastatin (LIPITOR) 20 MG Tab Take 1 Tablet by mouth every evening. 100 Tablet 3    vitamin D3 (CHOLECALCIFEROL) 400 UNIT Tab Take 1,000 Units by mouth every day.      Continuous Blood Gluc Sensor (FREESTYLE JAMIA 3 SENSOR) [facility] Continue glucose monitoring .  Change every 2 weeks 6 Each 3    Blood Glucose Meter Kit Freestyle FREEDOM Lite.  Check blood sugar  1x per day and prn ssx of high or low sugar 1 Kit 0    Blood Glucose Test Strips FREESTYLE FREEDOM LITE . Check blood sugar 1x per day and prn ssx of high or low sugar  100 Strip 3    Lancets Per insurance coverage. Check blood sugar 1x per day and prn ssx of high or low sugar 200 Each 6    Alcohol Swabs Per insurance coverage. Wipe site with prep pad prior to injection 200 Each 6    silodosin (RAPAFLO) 8 MG Cap capsule silodosin 8 mg capsule   TAKE ONE CAPSULE BY MOUTH ONE TIME DAILY      multivitamin (THERAGRAN) Tab Take 1 Tablet by mouth every day.      Omega-3 Fatty Acids (FISH OIL) 1000 MG Cap capsule Take 1,000 mg by mouth every day.      Ascorbic Acid (VITAMIN C) 100 MG Chew Tab Chew 1 Tablet every day. With Zinc        No current Epic-ordered facility-administered medications on file.       Past Medical History:   Diagnosis Date    Dyslipidemia     High cholesterol     Impaired fasting blood sugar        Past Surgical History:   Procedure Laterality Date    TURBT (TRANSURETHRAL RESECTION OF BLADDER TUMOR)  9/3/2021    Procedure: TURBT (TRANSURETHRAL RESECTION OF BLADDER TUMOR) - MONOPOLAR;  Surgeon: Cristino Muñoz M.D.;  Location: SURGERY [facility];  Service: Urology    COLONOSCOPY WITH POLYP  6/19/13    asc colon polyp-adenomatous, mild diverticulosis asc colon       Family History   Problem Relation Age of Onset    Diabetes Mother     Hypertension Mother     Hyperlipidemia Mother     Heart Attack Father         age 60s    Hypertension Father     Hyperlipidemia Father     Diabetes Sister     Diabetes Brother     Heart Attack Brother         age 60    Diabetes Brother     No Known Problems Sister        Social History     Tobacco Use   Smoking Status Former    Current packs/day: 0.30    Average packs/day: 0.3 packs/day for 30.0 years (9.0 ttl pk-yrs)    Types: Cigarettes   Smokeless Tobacco Never       Social History     Substance and Sexual Activity   Alcohol Use Not Currently    Alcohol/week: 1.2 oz    Types: 1 Cans of beer, 1 Shots of liquor per week    Comment: rui       Review of systems  As per HPI above. All other systems reviewed and negative.      Past Medical,  "Social, and Family history reviewed and updated in [facility]       LAB DATA:     I have independently reviewed / interpreted labs    Lab Results   Component Value Date/Time    HBA1C 5.9 (H) 11/25/2024 06:08 AM    HBA1C 5.8 (H) 05/22/2024 06:10 AM    HBA1C 6.3 (H) 11/20/2023 06:08 AM        Lab Results   Component Value Date/Time    WBC 6.8 11/25/2024 06:08 AM    HEMOGLOBIN 15.3 11/25/2024 06:08 AM    HEMATOCRIT 47.4 11/25/2024 06:08 AM    MCV 83.0 11/25/2024 06:08 AM    PLATELETCT 291 11/25/2024 06:08 AM       Lab Results   Component Value Date/Time    SODIUM 139 11/25/2024 06:08 AM    POTASSIUM 4.0 11/25/2024 06:08 AM    GLUCOSE 119 (H) 11/25/2024 06:08 AM    BUN 14 11/25/2024 06:08 AM    CREATININE 0.98 11/25/2024 06:08 AM       Lab Results   Component Value Date/Time    CHOLSTRLTOT 155 11/25/2024 06:08 AM    TRIGLYCERIDE 94 11/25/2024 06:08 AM    HDL 53 11/25/2024 06:08 AM    LDL 83 11/25/2024 06:08 AM       Lab Results   Component Value Date/Time    ALTSGPT 25 11/25/2024 06:08 AM          Objective     /76 (BP Location: Right arm, Patient Position: Sitting, BP Cuff Size: Adult)   Pulse 66   Temp 36.7 °C (98 °F) (Temporal)   Ht 1.778 m (5' 10\")   Wt 75 kg (165 lb 6.4 oz)   SpO2 97%    Body mass index is 23.73 kg/m².    General: alert in no apparent distress.  Cardiovascular: regular rate and rhythm  Pulmonary: lungs : no wheezing   Gastrointestinal: BS present.       Assessment and Plan     1. Dyslipidemia  - ALANINE AMINO-TRANS; Future  - Lipid Profile; Future  Chronic stable condition.  Continue atorvastatin 20 Mg daily.  Lipid panel result discussed with the patient.      2. Prediabetes  - HEMOGLOBIN A1C; Future  - Basic Metabolic Panel; Future  - metFORMIN ER (GLUCOPHAGE XR) 500 MG TABLET SR 24 HR; Take 1 Tablet by mouth every day.  Dispense: 100 Tablet; Refill: 4  Chronic stable.  Continue metformin 500 mg daily.  Continue with diet and lifestyle modification    3. Prostate hypertrophy  Chronic stable. "  Continue Rapaflo 8 mg daily.  Continue follow-up with urologist.    4. Atherosclerosis of aorta (HCC)  Chronic stable.  Continue atorvastatin 20 Mg daily    5. Abnormal MRI  This is a new finding.  MRI result discussed with the patient.  Patient will follow-up with urologist.    6. Vitamin D deficiency  - VITAMIN D,25 HYDROXY (DEFICIENCY); Future  - VITAMIN B12; Future  Chronic condition.  Current status unclear.  Lab test ordered to check vitamin D level    7. Marijuana use  Chronic condition.  Uncontrolled.  Strongly advised the patient to discontinue marijuana use.                  Please note that this dictation was created using voice recognition software. I have made every reasonable attempt to correct obvious errors, but I expect that there are errors of grammar and possibly content that I did not discover before finalizing the note.    Tomas Puckett MD  Internal Medicine  [facility]

## 2024-12-03 NOTE — ASSESSMENT & PLAN NOTE
Chronic ongoing condition.  The patient is being treated with metformin.  Blood test show A1c mildly elevated.  Stable.  Patient denies significant hypoglycemia.

## 2024-12-03 NOTE — ASSESSMENT & PLAN NOTE
This is a chronic condition.  The patient presently taking atorvastatin.  Lipid panel result discussed with the patient.  Patient tolerating the medication well.

## 2024-12-03 NOTE — ASSESSMENT & PLAN NOTE
This is a chronic condition.  Followed by urologist.  Patient is taking Rapaflo.  Patient denies fever chill dysuria or hematuria.

## 2025-02-24 DIAGNOSIS — E78.5 DYSLIPIDEMIA: ICD-10-CM

## 2025-02-24 DIAGNOSIS — R73.03 PREDIABETES: Chronic | ICD-10-CM

## 2025-02-24 RX ORDER — ACYCLOVIR 800 MG/1
TABLET ORAL
Qty: 6 EACH | Refills: 3 | Status: SHIPPED | OUTPATIENT
Start: 2025-02-24

## 2025-02-24 RX ORDER — METFORMIN HYDROCHLORIDE 500 MG/1
500 TABLET, EXTENDED RELEASE ORAL DAILY
Qty: 100 TABLET | Refills: 4 | Status: SHIPPED | OUTPATIENT
Start: 2025-02-24 | End: 2025-02-25 | Stop reason: SDUPTHER

## 2025-02-24 RX ORDER — ATORVASTATIN CALCIUM 20 MG/1
20 TABLET, FILM COATED ORAL EVERY EVENING
Qty: 100 TABLET | Refills: 2 | Status: SHIPPED | OUTPATIENT
Start: 2025-02-24

## 2025-02-24 NOTE — TELEPHONE ENCOUNTER
Received request via: Patient    Was the patient seen in the last year in this department? Yes    Does the patient have an active prescription (recently filled or refills available) for medication(s) requested? No      Does the patient have jail Plus and need 100-day supply? (This applies to ALL medications) Yes, quantity updated to 100 days

## 2025-02-24 NOTE — TELEPHONE ENCOUNTER
Received request via: Pharmacy    Was the patient seen in the last year in this department? Yes    Does the patient have an active prescription (recently filled or refills available) for medication(s) requested? No      Does the patient have [facility] Plus and need 100-day supply? (This applies to ALL medications) Yes, quantity updated to 100 days

## 2025-02-25 DIAGNOSIS — R73.03 PREDIABETES: Chronic | ICD-10-CM

## 2025-02-25 RX ORDER — METFORMIN HYDROCHLORIDE 500 MG/1
500 TABLET, EXTENDED RELEASE ORAL DAILY
Qty: 100 TABLET | Refills: 4 | Status: SHIPPED | OUTPATIENT
Start: 2025-02-25

## 2025-02-25 NOTE — TELEPHONE ENCOUNTER
Received request via: Pharmacy    Was the patient seen in the last year in this department? Yes    Does the patient have an active prescription (recently filled or refills available) for medication(s) requested? Yes. Pharmacy change    Pharmacy Name:   [facility] - [place], [place] - [address]  [address] [address]  Phone: [phone] Fax: [phone]      Does the patient have [facility] Plus and need 100-day supply? (This applies to ALL medications) Yes, quantity updated to 100 days

## 2025-05-30 ENCOUNTER — HOSPITAL ENCOUNTER (OUTPATIENT)
Dept: LAB | Facility: MEDICAL CENTER | Age: 65
End: 2025-05-30
Attending: INTERNAL MEDICINE
Payer: MEDICARE

## 2025-05-30 DIAGNOSIS — R73.03 PREDIABETES: Chronic | ICD-10-CM

## 2025-05-30 DIAGNOSIS — E55.9 VITAMIN D DEFICIENCY: ICD-10-CM

## 2025-05-30 DIAGNOSIS — E78.5 DYSLIPIDEMIA: Chronic | ICD-10-CM

## 2025-05-30 LAB
25(OH)D3 SERPL-MCNC: 40 NG/ML (ref 30–100)
ALT SERPL-CCNC: 33 U/L (ref 2–50)
ANION GAP SERPL CALC-SCNC: 12 MMOL/L (ref 7–16)
BUN SERPL-MCNC: 12 MG/DL (ref 8–22)
CALCIUM SERPL-MCNC: 9.5 MG/DL (ref 8.5–10.5)
CHLORIDE SERPL-SCNC: 106 MMOL/L (ref 96–112)
CHOLEST SERPL-MCNC: 147 MG/DL (ref 100–199)
CO2 SERPL-SCNC: 22 MMOL/L (ref 20–33)
CREAT SERPL-MCNC: 0.94 MG/DL (ref 0.5–1.4)
EST. AVERAGE GLUCOSE BLD GHB EST-MCNC: 131 MG/DL
GFR SERPLBLD CREATININE-BSD FMLA CKD-EPI: 89 ML/MIN/1.73 M 2
GLUCOSE SERPL-MCNC: 124 MG/DL (ref 65–99)
HBA1C MFR BLD: 6.2 % (ref 4–5.6)
HDLC SERPL-MCNC: 50 MG/DL
LDLC SERPL CALC-MCNC: 81 MG/DL
POTASSIUM SERPL-SCNC: 4.2 MMOL/L (ref 3.6–5.5)
SODIUM SERPL-SCNC: 140 MMOL/L (ref 135–145)
TRIGL SERPL-MCNC: 79 MG/DL (ref 0–149)
VIT B12 SERPL-MCNC: 965 PG/ML (ref 211–911)

## 2025-05-30 PROCEDURE — 83036 HEMOGLOBIN GLYCOSYLATED A1C: CPT

## 2025-05-30 PROCEDURE — 80048 BASIC METABOLIC PNL TOTAL CA: CPT

## 2025-05-30 PROCEDURE — 82306 VITAMIN D 25 HYDROXY: CPT

## 2025-05-30 PROCEDURE — 36415 COLL VENOUS BLD VENIPUNCTURE: CPT

## 2025-05-30 PROCEDURE — 82607 VITAMIN B-12: CPT

## 2025-05-30 PROCEDURE — 80061 LIPID PANEL: CPT

## 2025-05-30 PROCEDURE — 84460 ALANINE AMINO (ALT) (SGPT): CPT

## 2025-05-31 ENCOUNTER — RESULTS FOLLOW-UP (OUTPATIENT)
Dept: MEDICAL GROUP | Facility: PHYSICIAN GROUP | Age: 65
End: 2025-05-31

## 2025-06-04 ENCOUNTER — OFFICE VISIT (OUTPATIENT)
Dept: MEDICAL GROUP | Facility: PHYSICIAN GROUP | Age: 65
End: 2025-06-04
Payer: MEDICARE

## 2025-06-04 VITALS
SYSTOLIC BLOOD PRESSURE: 144 MMHG | OXYGEN SATURATION: 97 % | DIASTOLIC BLOOD PRESSURE: 82 MMHG | HEIGHT: 70 IN | TEMPERATURE: 97.3 F | WEIGHT: 162.4 LBS | BODY MASS INDEX: 23.25 KG/M2 | RESPIRATION RATE: 16 BRPM | HEART RATE: 60 BPM

## 2025-06-04 DIAGNOSIS — Z12.5 SCREENING FOR MALIGNANT NEOPLASM OF PROSTATE: ICD-10-CM

## 2025-06-04 DIAGNOSIS — E55.9 VITAMIN D DEFICIENCY: Chronic | ICD-10-CM

## 2025-06-04 DIAGNOSIS — E78.5 DYSLIPIDEMIA: Chronic | ICD-10-CM

## 2025-06-04 DIAGNOSIS — R79.89 ELEVATED VITAMIN B12 LEVEL: ICD-10-CM

## 2025-06-04 DIAGNOSIS — R53.82 CHRONIC FATIGUE: ICD-10-CM

## 2025-06-04 DIAGNOSIS — N40.0 PROSTATE HYPERTROPHY: Primary | Chronic | ICD-10-CM

## 2025-06-04 DIAGNOSIS — R73.03 PREDIABETES: Chronic | ICD-10-CM

## 2025-06-04 DIAGNOSIS — F12.90 MARIJUANA USE: Chronic | ICD-10-CM

## 2025-06-04 PROCEDURE — 99214 OFFICE O/P EST MOD 30 MIN: CPT | Performed by: INTERNAL MEDICINE

## 2025-06-04 PROCEDURE — 3079F DIAST BP 80-89 MM HG: CPT | Performed by: INTERNAL MEDICINE

## 2025-06-04 PROCEDURE — 3077F SYST BP >= 140 MM HG: CPT | Performed by: INTERNAL MEDICINE

## 2025-06-04 RX ORDER — TAMSULOSIN HYDROCHLORIDE 0.4 MG/1
0.4 CAPSULE ORAL
COMMUNITY
Start: 2025-02-24 | End: 2025-06-04

## 2025-06-04 ASSESSMENT — PATIENT HEALTH QUESTIONNAIRE - PHQ9: CLINICAL INTERPRETATION OF PHQ2 SCORE: 0

## 2025-06-04 NOTE — ASSESSMENT & PLAN NOTE
Chronic condition.  Patient is taking metformin.  Recent PSA is higher than previous.  Patient reported mother with diabetes.  Recommend diet and exercise.

## 2025-06-04 NOTE — ASSESSMENT & PLAN NOTE
Chronic condition.  Patient presently taking atorvastatin.  Recent lab test result discussed with patient.  Patient tolerating medication well.

## 2025-06-04 NOTE — PROGRESS NOTES
PRIMARY CARE CLINIC VISIT        Chief Complaint   Patient presents with    Follow-Up    Lab Results      Follow-up hyperlipidemia  Prediabetes  Marijuana use  Prostate hypertrophy  Elevated B12 level  Test result  Medication review      History of Present Illness     Dyslipidemia  Chronic condition.  Patient presently taking atorvastatin.  Recent lab test result discussed with patient.  Patient tolerating medication well.    Prediabetes  Chronic condition.  Patient is taking metformin.  Recent PSA is higher than previous.  Patient reported mother with diabetes.  Recommend diet and exercise.    Marijuana use  Chronic condition.  Potential risks and side effects of marijuana discussed with the patient.  Strongly advised the patient to discontinue marijuana use.    Prostate hypertrophy  Chronic condition.  Followed by urology.  Patient is presently taking Rapaflo.  Patient denies dysuria or hematuria.    Vitamin D deficiency  Chronic condition.  Patient presently taking vitamin D supplementation.  No new symptoms reported.    Elevated vitamin B12 level  New Condition.  Recent lab test showed elevated B12 level.  Patient asymptomatic.  Patient stated that he has been taking vitamin B-12 supplementation.  Advised the patient to reduce the dose.    Medications Ordered Prior to Encounter[1]     Allergies: Patient has no known allergies.    Current Medications and Prescriptions Ordered in Epic[2]    Past Medical History[3]    Past Surgical History[4]    Family History   Problem Relation Age of Onset    Diabetes Mother     Hypertension Mother     Hyperlipidemia Mother     Heart Attack Father         age 60s    Hypertension Father     Hyperlipidemia Father     Diabetes Sister     Diabetes Brother     Heart Attack Brother         age 60    Diabetes Brother     No Known Problems Sister        Tobacco Use History[5]    Social History     Substance and Sexual Activity   Alcohol Use Not Currently    Alcohol/week: 1.2 oz    Types:  "1 Cans of beer, 1 Shots of liquor per week    Comment: rui       Review of systems  As per HPI above. All other systems reviewed and negative.      Past Medical, Social, and Family history reviewed and updated in EPIC       LAB DATA:     I have independently reviewed / interpreted labs    Lab Results   Component Value Date/Time    HBA1C 6.2 (H) 05/30/2025 06:04 AM    HBA1C 5.9 (H) 11/25/2024 06:08 AM    HBA1C 5.8 (H) 05/22/2024 06:10 AM        Lab Results   Component Value Date/Time    WBC 6.8 11/25/2024 06:08 AM    HEMOGLOBIN 15.3 11/25/2024 06:08 AM    HEMATOCRIT 47.4 11/25/2024 06:08 AM    MCV 83.0 11/25/2024 06:08 AM    PLATELETCT 291 11/25/2024 06:08 AM       Lab Results   Component Value Date/Time    SODIUM 140 05/30/2025 06:04 AM    POTASSIUM 4.2 05/30/2025 06:04 AM    GLUCOSE 124 (H) 05/30/2025 06:04 AM    BUN 12 05/30/2025 06:04 AM    CREATININE 0.94 05/30/2025 06:04 AM       Lab Results   Component Value Date/Time    CHOLSTRLTOT 147 05/30/2025 06:04 AM    TRIGLYCERIDE 79 05/30/2025 06:04 AM    HDL 50 05/30/2025 06:04 AM    LDL 81 05/30/2025 06:04 AM       Lab Results   Component Value Date/Time    ALTSGPT 33 05/30/2025 06:04 AM          Objective     BP (!) 144/82 (BP Location: Left arm, Patient Position: Sitting, BP Cuff Size: Adult)   Pulse 60   Temp 36.3 °C (97.3 °F) (Temporal)   Resp 16   Ht 1.778 m (5' 10\")   Wt 73.7 kg (162 lb 6.4 oz)   SpO2 97%    Body mass index is 23.3 kg/m².    General: alert in no apparent distress.  Cardiovascular: regular rate and rhythm  Pulmonary: lungs : no wheezing   Gastrointestinal: BS present.       Assessment and Plan     1. Prostate hypertrophy  Chronic stable.  Continue Rapaflo 8 Mg daily.  Continue follow-up with urology service as directed    2. Dyslipidemia  - ALANINE AMINO-TRANS; Future  - Lipid Profile; Future  Chronic stable condition.  Continue atorvastatin 20 Mg daily.  Lipid panel result discussed with the patient    3. Prediabetes  - HEMOGLOBIN " A1C; Future  - Basic Metabolic Panel; Future  Chronic condition.  Worsening control.  Recommend diet and exercise.  Continue metformin 500 mg daily.  Repeat lab test next visit    4. Vitamin D deficiency  Chronic stable.  Continue vitamin D supplementation daily.    5. Marijuana use  Chronic condition.  Uncontrolled.  Strongly advised the patient to quit marijuana use completely    6. Screening for malignant neoplasm of prostate  - PROSTATE SPECIFIC AG SCREENING; Future    7. Chronic fatigue  - CBC WITH DIFFERENTIAL; Future    8. Elevated vitamin B12 level  New Condition.  Patient asymptomatic.  Recommended patient to reduce the vitamin B12 supplementation every other day.  Advised the patient to repeat the lab test next visit  - VITAMIN B12; Future          Follow-up 6 months          Please note that this dictation was created using voice recognition software. I have made every reasonable attempt to correct obvious errors, but I expect that there are errors of grammar and possibly content that I did not discover before finalizing the note.    Tomas Puckett MD  Internal Medicine  [facility]       [1]   Current Outpatient Medications on File Prior to Visit   Medication Sig Dispense Refill    metFORMIN ER (GLUCOPHAGE XR) 500 MG TABLET SR 24 HR Take 1 Tablet by mouth every day. 100 Tablet 4    atorvastatin (LIPITOR) 20 MG Tab TAKE 1 TABLET BY MOUTH IN THE  EVENING 100 Tablet 2    Continuous Glucose Sensor (FREESTYLE JAMIA 3 SENSOR) Mis Continue glucose monitoring .  Change every 2 weeks 6 Each 3    vitamin D3 (CHOLECALCIFEROL) 400 UNIT Tab Take 1,000 Units by mouth every day.      Blood Glucose Meter Kit Freestyle FREEDOM Lite.  Check blood sugar  1x per day and prn ssx of high or low sugar 1 Kit 0    Blood Glucose Test Strips FREESTYLE FREEDOM LITE . Check blood sugar 1x per day and prn ssx of high or low sugar 100 Strip 3    Lancets Per insurance coverage. Check blood sugar 1x per day and prn ssx of  high or low sugar 200 Each 6    Alcohol Swabs Per insurance coverage. Wipe site with prep pad prior to injection 200 Each 6    silodosin (RAPAFLO) 8 MG Cap capsule silodosin 8 mg capsule   TAKE ONE CAPSULE BY MOUTH ONE TIME DAILY      multivitamin (THERAGRAN) Tab Take 1 Tablet by mouth every day.      Omega-3 Fatty Acids (FISH OIL) 1000 MG Cap capsule Take 1,000 mg by mouth every day.      Ascorbic Acid (VITAMIN C) 100 MG Chew Tab Chew 1 Tablet every day. With Zinc        No current facility-administered medications on file prior to visit.   [2]   Current Outpatient Medications Ordered in Epic   Medication Sig Dispense Refill    metFORMIN ER (GLUCOPHAGE XR) 500 MG TABLET SR 24 HR Take 1 Tablet by mouth every day. 100 Tablet 4    atorvastatin (LIPITOR) 20 MG Tab TAKE 1 TABLET BY MOUTH IN THE  EVENING 100 Tablet 2    Continuous Glucose Sensor (FREESTYLE AJMIA 3 SENSOR) [facility] Continue glucose monitoring .  Change every 2 weeks 6 Each 3    vitamin D3 (CHOLECALCIFEROL) 400 UNIT Tab Take 1,000 Units by mouth every day.      Blood Glucose Meter Kit Freestyle FREEDOM Lite.  Check blood sugar  1x per day and prn ssx of high or low sugar 1 Kit 0    Blood Glucose Test Strips FREESTYLE FREEDOM LITE . Check blood sugar 1x per day and prn ssx of high or low sugar 100 Strip 3    Lancets Per insurance coverage. Check blood sugar 1x per day and prn ssx of high or low sugar 200 Each 6    Alcohol Swabs Per insurance coverage. Wipe site with prep pad prior to injection 200 Each 6    silodosin (RAPAFLO) 8 MG Cap capsule silodosin 8 mg capsule   TAKE ONE CAPSULE BY MOUTH ONE TIME DAILY      multivitamin (THERAGRAN) Tab Take 1 Tablet by mouth every day.      Omega-3 Fatty Acids (FISH OIL) 1000 MG Cap capsule Take 1,000 mg by mouth every day.      Ascorbic Acid (VITAMIN C) 100 MG Chew Tab Chew 1 Tablet every day. With Zinc        No current Epic-ordered facility-administered medications on file.   [3]   Past Medical History:  Diagnosis Date     Dyslipidemia     High cholesterol     Impaired fasting blood sugar    [4]   Past Surgical History:  Procedure Laterality Date    TURBT (TRANSURETHRAL RESECTION OF BLADDER TUMOR)  9/3/2021    Procedure: TURBT (TRANSURETHRAL RESECTION OF BLADDER TUMOR) - MONOPOLAR;  Surgeon: Cristino Muñoz M.D.;  [facility];  Service: Urology    COLONOSCOPY WITH POLYP  6/19/13    asc colon polyp-adenomatous, mild diverticulosis asc colon   [5]   Social History  Tobacco Use   Smoking Status Former    Current packs/day: 0.30    Average packs/day: 0.3 packs/day for 30.0 years (9.0 ttl pk-yrs)    Types: Cigarettes   Smokeless Tobacco Never

## 2025-06-04 NOTE — ASSESSMENT & PLAN NOTE
Chronic condition.  Patient presently taking vitamin D supplementation.  No new symptoms reported.

## 2025-06-04 NOTE — ASSESSMENT & PLAN NOTE
Chronic condition.  Followed by urology.  Patient is presently taking Rapaflo.  Patient denies dysuria or hematuria.

## 2025-06-04 NOTE — ASSESSMENT & PLAN NOTE
Chronic condition.  Potential risks and side effects of marijuana discussed with the patient.  Strongly advised the patient to discontinue marijuana use.

## 2025-06-04 NOTE — ASSESSMENT & PLAN NOTE
New Condition.  Recent lab test showed elevated B12 level.  Patient asymptomatic.  Patient stated that he has been taking vitamin B-12 supplementation.  Advised the patient to reduce the dose.

## (undated) DEVICE — JELLY SURGILUBE STERILE TUBE 4.25 OZ (1/EA)

## (undated) DEVICE — LACTATED RINGERS INJ 1000 ML - (14EA/CA 60CA/PF)

## (undated) DEVICE — SET IRRIGATION CYSTOSCOPY Y-TYPE L81 IN (20EA/CA)

## (undated) DEVICE — NEPTUNE 4 PORT MANIFOLD - (20/PK)

## (undated) DEVICE — BAG URODRAIN WITH TUBING - (20/CA)

## (undated) DEVICE — SET EXTENSION WITH 2 PORTS (48EA/CA) ***PART #2C8610 IS A SUBSTITUTE*****

## (undated) DEVICE — GVL 4 STAT DISPOSABLE - (10/BX)

## (undated) DEVICE — ELECTRODE 850 FOAM ADHESIVE - HYDROGEL RADIOTRNSPRNT (50/PK)

## (undated) DEVICE — TUBING CLEARLINK DUO-VENT - C-FLO (48EA/CA)

## (undated) DEVICE — GLOVE BIOGEL SZ 7.5 SURGICAL PF LTX - (50PR/BX 4BX/CA)

## (undated) DEVICE — HEAD HOLDER JUNIOR/ADULT

## (undated) DEVICE — SET LEADWIRE 5 LEAD BEDSIDE DISPOSABLE ECG (1SET OF 5/EA)

## (undated) DEVICE — PROTECTOR ULNA NERVE - (36PR/CA)

## (undated) DEVICE — TUBE CONNECT SUCTION CLEAR 120 X 1/4" (50EA/CA)"

## (undated) DEVICE — PACK CYSTOSCOPY III - (8/CA)

## (undated) DEVICE — TUBE E-T HI-LO CUFF 7.5MM (10EA/PK)

## (undated) DEVICE — CONNECTOR HOSE NEPTUNE FOR CYSTO ROOM

## (undated) DEVICE — CATHETER URETHRAL FOLEY SILICONE OD20 FR 10 ML (10EA/CA)

## (undated) DEVICE — GLOVE BIOGEL PI INDICATOR SZ 6.5 SURGICAL PF LF - (50/BX 4BX/CA)

## (undated) DEVICE — GOWN SURGEONS X-LARGE - DISP. (30/CA)

## (undated) DEVICE — SPONGE GAUZESTER 4 X 4 4PLY - (128PK/CA)

## (undated) DEVICE — ELECTRODE MONOPOLAR ANGLED CUTTING LOOP DIAM 0.35 YELLOW 24FR (6EA/PK)

## (undated) DEVICE — WATER IRRIG. STER 3000 ML - (4/CA)

## (undated) DEVICE — KIT ANESTHESIA W/CIRCUIT & 3/LT BAG W/FILTER (20EA/CA)

## (undated) DEVICE — CONTAINER SPECIMEN BAG OR - STERILE 4 OZ W/LID (100EA/CA)

## (undated) DEVICE — COVER FOOT UNIVERSAL DISP. - (25EA/CA)

## (undated) DEVICE — GOWN WARMING STANDARD FLEX - (30/CA)

## (undated) DEVICE — SLEEVE, VASO, THIGH, MED

## (undated) DEVICE — MASK ANESTHESIA ADULT  - (100/CA)

## (undated) DEVICE — MEDICINE CUP STERILE 2 OZ - (100/CA)

## (undated) DEVICE — GOWN SURGICAL X-LARGE ULTRA - FILM-REINFORCED (20/CA)

## (undated) DEVICE — BAG DRAINAGE URINARY CLOSED 2000ML (20EA/CA)

## (undated) DEVICE — SENSOR SPO2 NEO LNCS ADHESIVE (20/BX) SEE USER NOTES

## (undated) DEVICE — SUCTION INSTRUMENT YANKAUER BULBOUS TIP W/O VENT (50EA/CA)

## (undated) DEVICE — ELECTRODE DUAL RETURN W/ CORD - (50/PK)

## (undated) DEVICE — TOWEL STOP TIMEOUT SAFETY FLAG (40EA/CA)